# Patient Record
Sex: MALE | Race: WHITE | Employment: OTHER | URBAN - METROPOLITAN AREA
[De-identification: names, ages, dates, MRNs, and addresses within clinical notes are randomized per-mention and may not be internally consistent; named-entity substitution may affect disease eponyms.]

---

## 2017-02-05 ENCOUNTER — HOSPITAL ENCOUNTER (EMERGENCY)
Age: 67
Discharge: HOME | End: 2017-02-05
Attending: EMERGENCY MEDICINE
Payer: COMMERCIAL

## 2017-02-05 ENCOUNTER — APPOINTMENT (OUTPATIENT)
Dept: GENERAL RADIOLOGY | Age: 67
End: 2017-02-05
Payer: COMMERCIAL

## 2017-02-05 VITALS
HEIGHT: 70 IN | RESPIRATION RATE: 16 BRPM | BODY MASS INDEX: 31.5 KG/M2 | SYSTOLIC BLOOD PRESSURE: 131 MMHG | WEIGHT: 220 LBS | OXYGEN SATURATION: 100 % | DIASTOLIC BLOOD PRESSURE: 74 MMHG | TEMPERATURE: 97.7 F | HEART RATE: 72 BPM

## 2017-02-05 DIAGNOSIS — M25.512 ACUTE PAIN OF LEFT SHOULDER: Primary | ICD-10-CM

## 2017-02-05 PROCEDURE — 99283 EMERGENCY DEPT VISIT LOW MDM: CPT

## 2017-02-05 PROCEDURE — 73030 X-RAY EXAM OF SHOULDER: CPT | Performed by: RADIOLOGY

## 2017-02-05 PROCEDURE — 6370000000 HC RX 637 (ALT 250 FOR IP): Performed by: PHYSICIAN ASSISTANT

## 2017-02-05 PROCEDURE — 73030 X-RAY EXAM OF SHOULDER: CPT

## 2017-02-05 RX ORDER — TRAMADOL HYDROCHLORIDE 50 MG/1
50 TABLET ORAL EVERY 6 HOURS PRN
Qty: 20 TABLET | Refills: 0 | Status: SHIPPED | OUTPATIENT
Start: 2017-02-05 | End: 2017-02-15

## 2017-02-05 RX ORDER — TRAMADOL HYDROCHLORIDE 50 MG/1
50 TABLET ORAL ONCE
Status: COMPLETED | OUTPATIENT
Start: 2017-02-05 | End: 2017-02-05

## 2017-02-05 RX ADMIN — TRAMADOL HYDROCHLORIDE 50 MG: 50 TABLET, FILM COATED ORAL at 11:32

## 2017-02-05 ASSESSMENT — PAIN SCALES - GENERAL: PAINLEVEL_OUTOF10: 9

## 2017-11-21 ENCOUNTER — HOSPITAL ENCOUNTER (OUTPATIENT)
Dept: PREADMISSION TESTING | Age: 67
Discharge: HOME | End: 2017-11-21
Payer: COMMERCIAL

## 2017-11-21 VITALS
BODY MASS INDEX: 31.8 KG/M2 | WEIGHT: 222.12 LBS | HEIGHT: 70 IN | SYSTOLIC BLOOD PRESSURE: 111 MMHG | TEMPERATURE: 95.5 F | OXYGEN SATURATION: 97 % | RESPIRATION RATE: 18 BRPM | HEART RATE: 71 BPM | DIASTOLIC BLOOD PRESSURE: 63 MMHG

## 2017-11-21 LAB
EKG ATRIAL RATE: 66 BPM
EKG P AXIS: 7 DEGREES
EKG P-R INTERVAL: 160 MS
EKG Q-T INTERVAL: 432 MS
EKG QRS DURATION: 88 MS
EKG QTC CALCULATION (BAZETT): 452 MS
EKG R AXIS: -5 DEGREES
EKG T AXIS: 39 DEGREES
EKG VENTRICULAR RATE: 66 BPM
GLUCOSE BLD-MCNC: 147 MG/DL (ref 70–99)

## 2017-11-21 PROCEDURE — 93005 ELECTROCARDIOGRAM TRACING: CPT

## 2017-11-21 PROCEDURE — 36415 COLL VENOUS BLD VENIPUNCTURE: CPT

## 2017-11-21 PROCEDURE — 82947 ASSAY GLUCOSE BLOOD QUANT: CPT

## 2017-11-21 RX ORDER — SODIUM CHLORIDE, SODIUM LACTATE, POTASSIUM CHLORIDE, CALCIUM CHLORIDE 600; 310; 30; 20 MG/100ML; MG/100ML; MG/100ML; MG/100ML
1000 INJECTION, SOLUTION INTRAVENOUS CONTINUOUS
Status: CANCELLED | OUTPATIENT
Start: 2017-11-21

## 2017-11-21 NOTE — H&P
History and Physical    Pt Name: Ana Scheuermann, MD  MRN: [id number]  YOB: 1950  Date of evaluation: 11/21/2017  Primary Care Physician: Chantelle Garcia MD  Patient evaluated at the request of  Dr. 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 Past Medical History      has a past medical history of Hyperlipidemia, mixed; Kidney stone; Neuropathy ([address] and Shingles. Past Surgical History   has a past surgical history that includes back surgery (03/1989) and Tonsillectomy (1954). Medications   Scheduled Meds:  Current Outpatient Rx   Medication Sig Dispense Refill    rosuvastatin (CRESTOR) 10 MG tablet TAKE 1 TABLET DAILY 90 tablet 2    pregabalin (LYRICA) 150 MG capsule Take 1 capsule by mouth 2 times daily 180 capsule 1     Continuous Infusions:  PRN Meds:. Allergies  has No Known Allergies. Family History    family history is not on file. No family status information on file. Social History  Social History     Social History    Marital status:      Spouse name: N/A    Number of children: N/A    Years of education: N/A     Occupational History    Not on file. Social History Main Topics    Smoking status: Never Smoker    Smokeless tobacco: Never Used    Alcohol use Yes      Comment: socially    Drug use: No    Sexual activity: Not on file     Other Topics Concern    Not on file     Social History Narrative    No narrative on file        Service:No    Occupation: chiropractor     Hobbies:  Rene     OBJECTIVE:   VITALS:  height is 5' 10\" (1.778 m) and weight is 222 lb 1.9 oz (100.8 kg). His oral temperature is 95.5 °F (35.3 °C). His blood pressure is 111/63 and his pulse is 71.  His

## 2017-11-21 NOTE — ANESTHESIA PRE-OP
Anesthesia Focused Assessment    STOP-BANG Sleep Apnea Questionnaire    Obstructive Sleep Apnea:                                                                 No     Machine used:                                                                                  No            SNORE loudly (heard through closed doors)? No      TIRED, fatigued, sleepy during daytime? No      OBSERVED stopping breathing during sleep? No      High blood PRESSURE being treated? No      BMI over 35? No  AGE over 48? Yes  NECK circumference over 16\"? Yes   GENDER (male)? Yes                High risk 5-8  Intermediate risk 3-4  Low risk 0-2    Total 3  High risk 5-8  Intermediate risk 3-4  Low risk 0-2      Type 1 DM:                                                                                        No    TYPE 2:                                                                                             No    If yes, insulin dependent? No    Coronary Artery Disease :                                                                No        Active smoker                                                                                    no    Drinks Alcohol                                                                                   Yes, occasional    Dentition: Dentures                                                                            No              Partial                                                                                                 No    Any loose or missing teeth                                                                 No    Defib / AICD / Pacemaker:                                                               No    Renal Failure: No    If Yes, On dialysis?       Hx of anesthesia complications with Patient                               No    Hx of anesthesia complications with family No    Medical or cardiac clearance ordered:                                         Yes, medical with heart     Anesthesiologist called:                                                                Yes, Dr. Bon Garg PA-C  Electronically signed 11/21/2017 at 8:29 AM

## 2017-12-07 ENCOUNTER — ANESTHESIA EVENT (OUTPATIENT)
Dept: OPERATING ROOM | Age: 67
End: 2017-12-07
Payer: COMMERCIAL

## 2017-12-07 ENCOUNTER — ANESTHESIA (OUTPATIENT)
Dept: OPERATING ROOM | Age: 67
End: 2017-12-07
Payer: COMMERCIAL

## 2017-12-07 ENCOUNTER — HOSPITAL ENCOUNTER (OUTPATIENT)
Age: 67
Setting detail: OUTPATIENT SURGERY
Discharge: HOME | End: 2017-12-07
Attending: PLASTIC SURGERY | Admitting: PLASTIC SURGERY
Payer: COMMERCIAL

## 2017-12-07 VITALS
HEIGHT: 70 IN | WEIGHT: 220 LBS | RESPIRATION RATE: 16 BRPM | DIASTOLIC BLOOD PRESSURE: 71 MMHG | BODY MASS INDEX: 31.5 KG/M2 | SYSTOLIC BLOOD PRESSURE: 117 MMHG | HEART RATE: 55 BPM | OXYGEN SATURATION: 97 % | TEMPERATURE: 97.5 F

## 2017-12-07 VITALS
DIASTOLIC BLOOD PRESSURE: 53 MMHG | TEMPERATURE: 91.2 F | RESPIRATION RATE: 10 BRPM | SYSTOLIC BLOOD PRESSURE: 85 MMHG | OXYGEN SATURATION: 97 %

## 2017-12-07 PROCEDURE — 3600000004 HC SURGERY LEVEL 4 BASE: Performed by: PLASTIC SURGERY

## 2017-12-07 PROCEDURE — 2580000003 HC RX 258: Performed by: ANESTHESIOLOGY

## 2017-12-07 PROCEDURE — 3700000001 HC ADD 15 MINUTES (ANESTHESIA): Performed by: PLASTIC SURGERY

## 2017-12-07 PROCEDURE — 2500000003 HC RX 250 WO HCPCS: Performed by: SPECIALIST

## 2017-12-07 PROCEDURE — 6360000002 HC RX W HCPCS: Performed by: SPECIALIST

## 2017-12-07 PROCEDURE — 3600000014 HC SURGERY LEVEL 4 ADDTL 15MIN: Performed by: PLASTIC SURGERY

## 2017-12-07 PROCEDURE — 6360000002 HC RX W HCPCS: Performed by: ANESTHESIOLOGY

## 2017-12-07 PROCEDURE — 7100000001 HC PACU RECOVERY - ADDTL 15 MIN: Performed by: PLASTIC SURGERY

## 2017-12-07 PROCEDURE — 7100000010 HC PHASE II RECOVERY - FIRST 15 MIN: Performed by: PLASTIC SURGERY

## 2017-12-07 PROCEDURE — 2500000003 HC RX 250 WO HCPCS: Performed by: PLASTIC SURGERY

## 2017-12-07 PROCEDURE — 3700000000 HC ANESTHESIA ATTENDED CARE: Performed by: PLASTIC SURGERY

## 2017-12-07 PROCEDURE — 7100000011 HC PHASE II RECOVERY - ADDTL 15 MIN: Performed by: PLASTIC SURGERY

## 2017-12-07 PROCEDURE — A6223 GAUZE >16<=48 NO W/SAL W/O B: HCPCS | Performed by: PLASTIC SURGERY

## 2017-12-07 PROCEDURE — 6360000002 HC RX W HCPCS: Performed by: PLASTIC SURGERY

## 2017-12-07 PROCEDURE — 2580000003 HC RX 258: Performed by: PLASTIC SURGERY

## 2017-12-07 PROCEDURE — 7100000000 HC PACU RECOVERY - FIRST 15 MIN: Performed by: PLASTIC SURGERY

## 2017-12-07 PROCEDURE — 6360000002 HC RX W HCPCS: Performed by: STUDENT IN AN ORGANIZED HEALTH CARE EDUCATION/TRAINING PROGRAM

## 2017-12-07 PROCEDURE — 6370000000 HC RX 637 (ALT 250 FOR IP): Performed by: ANESTHESIOLOGY

## 2017-12-07 RX ORDER — ONDANSETRON 2 MG/ML
4 INJECTION INTRAMUSCULAR; INTRAVENOUS
Status: DISCONTINUED | OUTPATIENT
Start: 2017-12-07 | End: 2017-12-07 | Stop reason: HOSPADM

## 2017-12-07 RX ORDER — FENTANYL CITRATE 50 UG/ML
INJECTION, SOLUTION INTRAMUSCULAR; INTRAVENOUS PRN
Status: DISCONTINUED | OUTPATIENT
Start: 2017-12-07 | End: 2017-12-07 | Stop reason: SDUPTHER

## 2017-12-07 RX ORDER — DIPHENHYDRAMINE HYDROCHLORIDE 50 MG/ML
12.5 INJECTION INTRAMUSCULAR; INTRAVENOUS
Status: DISCONTINUED | OUTPATIENT
Start: 2017-12-07 | End: 2017-12-07 | Stop reason: HOSPADM

## 2017-12-07 RX ORDER — SCOLOPAMINE TRANSDERMAL SYSTEM 1 MG/1
1 PATCH, EXTENDED RELEASE TRANSDERMAL ONCE
Status: DISCONTINUED | OUTPATIENT
Start: 2017-12-07 | End: 2017-12-07 | Stop reason: HOSPADM

## 2017-12-07 RX ORDER — LIDOCAINE HYDROCHLORIDE 10 MG/ML
INJECTION, SOLUTION EPIDURAL; INFILTRATION; INTRACAUDAL; PERINEURAL PRN
Status: DISCONTINUED | OUTPATIENT
Start: 2017-12-07 | End: 2017-12-07 | Stop reason: SDUPTHER

## 2017-12-07 RX ORDER — MIDAZOLAM HYDROCHLORIDE 1 MG/ML
2 INJECTION INTRAMUSCULAR; INTRAVENOUS ONCE
Status: COMPLETED | OUTPATIENT
Start: 2017-12-07 | End: 2017-12-07

## 2017-12-07 RX ORDER — MAGNESIUM HYDROXIDE 1200 MG/15ML
LIQUID ORAL CONTINUOUS PRN
Status: DISCONTINUED | OUTPATIENT
Start: 2017-12-07 | End: 2017-12-07 | Stop reason: HOSPADM

## 2017-12-07 RX ORDER — PROMETHAZINE HYDROCHLORIDE 25 MG/ML
6.25 INJECTION, SOLUTION INTRAMUSCULAR; INTRAVENOUS
Status: DISCONTINUED | OUTPATIENT
Start: 2017-12-07 | End: 2017-12-07 | Stop reason: HOSPADM

## 2017-12-07 RX ORDER — FENTANYL CITRATE 50 UG/ML
50 INJECTION, SOLUTION INTRAMUSCULAR; INTRAVENOUS EVERY 5 MIN PRN
Status: DISCONTINUED | OUTPATIENT
Start: 2017-12-07 | End: 2017-12-07 | Stop reason: HOSPADM

## 2017-12-07 RX ORDER — FENTANYL CITRATE 50 UG/ML
25 INJECTION, SOLUTION INTRAMUSCULAR; INTRAVENOUS EVERY 5 MIN PRN
Status: DISCONTINUED | OUTPATIENT
Start: 2017-12-07 | End: 2017-12-07 | Stop reason: HOSPADM

## 2017-12-07 RX ORDER — DEXAMETHASONE SODIUM PHOSPHATE 10 MG/ML
INJECTION INTRAMUSCULAR; INTRAVENOUS PRN
Status: DISCONTINUED | OUTPATIENT
Start: 2017-12-07 | End: 2017-12-07 | Stop reason: SDUPTHER

## 2017-12-07 RX ORDER — PROPOFOL 10 MG/ML
INJECTION, EMULSION INTRAVENOUS PRN
Status: DISCONTINUED | OUTPATIENT
Start: 2017-12-07 | End: 2017-12-07 | Stop reason: SDUPTHER

## 2017-12-07 RX ORDER — BUPIVACAINE HYDROCHLORIDE AND EPINEPHRINE 2.5; 5 MG/ML; UG/ML
INJECTION, SOLUTION EPIDURAL; INFILTRATION; INTRACAUDAL; PERINEURAL PRN
Status: DISCONTINUED | OUTPATIENT
Start: 2017-12-07 | End: 2017-12-07 | Stop reason: HOSPADM

## 2017-12-07 RX ORDER — CEPHALEXIN 500 MG/1
500 CAPSULE ORAL 3 TIMES DAILY
Qty: 21 CAPSULE | Refills: 0 | Status: SHIPPED | OUTPATIENT
Start: 2017-12-07 | End: 2017-12-14

## 2017-12-07 RX ORDER — SODIUM CHLORIDE, SODIUM LACTATE, POTASSIUM CHLORIDE, CALCIUM CHLORIDE 600; 310; 30; 20 MG/100ML; MG/100ML; MG/100ML; MG/100ML
1000 INJECTION, SOLUTION INTRAVENOUS CONTINUOUS
Status: DISCONTINUED | OUTPATIENT
Start: 2017-12-07 | End: 2017-12-07 | Stop reason: HOSPADM

## 2017-12-07 RX ORDER — TRIAMCINOLONE ACETONIDE 40 MG/ML
INJECTION, SUSPENSION INTRA-ARTICULAR; INTRAMUSCULAR PRN
Status: DISCONTINUED | OUTPATIENT
Start: 2017-12-07 | End: 2017-12-07 | Stop reason: HOSPADM

## 2017-12-07 RX ORDER — OXYCODONE HYDROCHLORIDE AND ACETAMINOPHEN 5; 325 MG/1; MG/1
1 TABLET ORAL EVERY 4 HOURS PRN
Qty: 30 TABLET | Refills: 0 | Status: SHIPPED | OUTPATIENT
Start: 2017-12-07 | End: 2017-12-14

## 2017-12-07 RX ORDER — ONDANSETRON 2 MG/ML
INJECTION INTRAMUSCULAR; INTRAVENOUS PRN
Status: DISCONTINUED | OUTPATIENT
Start: 2017-12-07 | End: 2017-12-07 | Stop reason: SDUPTHER

## 2017-12-07 RX ADMIN — SODIUM CHLORIDE, POTASSIUM CHLORIDE, SODIUM LACTATE AND CALCIUM CHLORIDE 1000 ML: 600; 310; 30; 20 INJECTION, SOLUTION INTRAVENOUS at 08:23

## 2017-12-07 RX ADMIN — MIDAZOLAM HYDROCHLORIDE 2 MG: 1 INJECTION, SOLUTION INTRAMUSCULAR; INTRAVENOUS at 08:39

## 2017-12-07 RX ADMIN — FENTANYL CITRATE 50 MCG: 50 INJECTION INTRAMUSCULAR; INTRAVENOUS at 08:48

## 2017-12-07 RX ADMIN — ONDANSETRON 4 MG: 2 INJECTION INTRAMUSCULAR; INTRAVENOUS at 09:32

## 2017-12-07 RX ADMIN — LIDOCAINE HYDROCHLORIDE 50 MG: 10 INJECTION, SOLUTION EPIDURAL; INFILTRATION; INTRACAUDAL; PERINEURAL at 08:48

## 2017-12-07 RX ADMIN — Medication 2 G: at 08:55

## 2017-12-07 RX ADMIN — FENTANYL CITRATE 25 MCG: 50 INJECTION INTRAMUSCULAR; INTRAVENOUS at 09:23

## 2017-12-07 RX ADMIN — PROPOFOL 200 MG: 10 INJECTION, EMULSION INTRAVENOUS at 08:48

## 2017-12-07 RX ADMIN — FENTANYL CITRATE 25 MCG: 50 INJECTION INTRAMUSCULAR; INTRAVENOUS at 09:21

## 2017-12-07 RX ADMIN — DEXAMETHASONE SODIUM PHOSPHATE 10 MG: 10 INJECTION INTRAMUSCULAR; INTRAVENOUS at 08:54

## 2017-12-07 ASSESSMENT — PULMONARY FUNCTION TESTS
PIF_VALUE: 1
PIF_VALUE: 3
PIF_VALUE: 20
PIF_VALUE: 8
PIF_VALUE: 8
PIF_VALUE: 1
PIF_VALUE: 6
PIF_VALUE: 8
PIF_VALUE: 5
PIF_VALUE: 6
PIF_VALUE: 9
PIF_VALUE: 12
PIF_VALUE: 14
PIF_VALUE: 2
PIF_VALUE: 8
PIF_VALUE: 2
PIF_VALUE: 8
PIF_VALUE: 6
PIF_VALUE: 6
PIF_VALUE: 4
PIF_VALUE: 11
PIF_VALUE: 15
PIF_VALUE: 16
PIF_VALUE: 3
PIF_VALUE: 5
PIF_VALUE: 4
PIF_VALUE: 8
PIF_VALUE: 3
PIF_VALUE: 5
PIF_VALUE: 6
PIF_VALUE: 1
PIF_VALUE: 6
PIF_VALUE: 2
PIF_VALUE: 6
PIF_VALUE: 5
PIF_VALUE: 1
PIF_VALUE: 3
PIF_VALUE: 6
PIF_VALUE: 8
PIF_VALUE: 6
PIF_VALUE: 8
PIF_VALUE: 8
PIF_VALUE: 5
PIF_VALUE: 8
PIF_VALUE: 5
PIF_VALUE: 3
PIF_VALUE: 8
PIF_VALUE: 5
PIF_VALUE: 6
PIF_VALUE: 6
PIF_VALUE: 8
PIF_VALUE: 8
PIF_VALUE: 4
PIF_VALUE: 8
PIF_VALUE: 5
PIF_VALUE: 8
PIF_VALUE: 4
PIF_VALUE: 3
PIF_VALUE: 3
PIF_VALUE: 8
PIF_VALUE: 8

## 2017-12-07 ASSESSMENT — PAIN SCALES - WONG BAKER
WONGBAKER_NUMERICALRESPONSE: 0

## 2017-12-07 ASSESSMENT — PAIN SCALES - GENERAL
PAINLEVEL_OUTOF10: 0

## 2017-12-07 ASSESSMENT — PAIN - FUNCTIONAL ASSESSMENT: PAIN_FUNCTIONAL_ASSESSMENT: 0-10

## 2017-12-07 NOTE — H&P (VIEW-ONLY)
History and Physical    Pt Name: Essie Correa MD  MRN: [id number]  YOB: 1950  Date of evaluation: 11/21/2017  Primary Care Physician: Vik Morris MD  Patient evaluated at the request of  Dr. 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 Past Medical History      has a past medical history of Hyperlipidemia, mixed; Kidney stone; Neuropathy ([address] and Shingles. Past Surgical History   has a past surgical history that includes back surgery (03/1989) and Tonsillectomy (1954). Medications   Scheduled Meds:  Current Outpatient Rx   Medication Sig Dispense Refill    rosuvastatin (CRESTOR) 10 MG tablet TAKE 1 TABLET DAILY 90 tablet 2    pregabalin (LYRICA) 150 MG capsule Take 1 capsule by mouth 2 times daily 180 capsule 1     Continuous Infusions:  PRN Meds:. Allergies  has No Known Allergies. Family History    family history is not on file. No family status information on file. Social History  Social History     Social History    Marital status:      Spouse name: N/A    Number of children: N/A    Years of education: N/A     Occupational History    Not on file. Social History Main Topics    Smoking status: Never Smoker    Smokeless tobacco: Never Used    Alcohol use Yes      Comment: socially    Drug use: No    Sexual activity: Not on file     Other Topics Concern    Not on file     Social History Narrative    No narrative on file        Service:No    Occupation: chiropractor     Hobbies:  [facility]     OBJECTIVE:   VITALS:  height is 5' 10\" (1.778 m) and weight is 222 lb 1.9 oz (100.8 kg). His oral temperature is 95.5 °F (35.3 °C). His blood pressure is 111/63 and his pulse is 71.  His respiration is 18 and oxygen saturation is 97%. CONSTITUTIONAL: Alert and oriented times 3, no acute distress and cooperative to examination. friendly and pleasant     SKIN: rash No    HEENT: Head is normocephalic, atraumatic. EOMI, PERRLA    Oral air way :slightly narrow Yes    NECK: neck supple, no lymphadenopathy noted, trachea midline and straight       2+ carotid, no bruit    LUNGS: Chest expands equally bilaterally upon respiration, no accessory muscle used. Ausculation reveals no adventitious breath sounds. CARDIOVASCULAR: \"Heart sounds are normal.  Regular rate and rhythm without murmur,    ABDOMEN: Bowel sounds are present in all four quadrants      GENATALIA:Deferred. NEUROLOGIC: \"CN II-XII are grossly intact. EXTREMITIES: Pitting edema:  No,  Varicose veins: No     Dorsal pedal/posterior tibial pulses palpable: Yes         Strength:  Normal  inj left       Patient Active Problem List   Diagnosis    Peripheral neuropathic pain    Hyperlipidemia, mixed    Hyperglycemia               IMPRESSIONS:   1. Trigger finger right hand 2nd and 3rd digit   2.  does not have any pertinent problems on file.     Eileen [facility]  Electronically signed 11/21/2017 at 8:29 AM       Scheduled for:Trigger finger right hand 2nd and 3rd digit  Release A-one pulley

## 2017-12-07 NOTE — PROGRESS NOTES
Dc instructions reviewed with pt's wife. Copies provided with scripts.  All questions presented answered

## 2017-12-07 NOTE — ANESTHESIA POSTPROCEDURE EVALUATION
Department of Anesthesiology  Postprocedure Note    Patient: Jacques Meigs, MD  MRN: [id number]  YOB: 1950  Date of evaluation: 12/7/2017  Time:  1:10 PM     Procedure Summary     Date:  12/07/17 Room / Location:  [place] [address] / [facility] OR    Anesthesia Start:  0845 Anesthesia Stop:  1001    Procedure:  RELEASE A-ONE PULLEY INDEX, MIDDLE FINGERS (Right ) Diagnosis:  (TRIGGER FINGERS RIGHT INDEX, MIDDLE )    Surgeon:  Hamilton Chase MD Responsible Provider:  Janelle Urena MD    Anesthesia Type:  general ASA Status:  2          Anesthesia Type: general    Jackie Phase I: Jackie Score: 10    Jackie Phase II: Jackie Score: 10    Last vitals: Reviewed and per EMR flowsheets.        Anesthesia Post Evaluation    Patient location during evaluation: PACU  Patient participation: complete - patient participated  Level of consciousness: awake and alert  Pain score: 0  Airway patency: patent  Nausea & Vomiting: no nausea and no vomiting  Complications: no  Cardiovascular status: blood pressure returned to baseline and hemodynamically stable  Respiratory status: acceptable and nonlabored ventilation  Hydration status: euvolemic

## 2017-12-07 NOTE — ANESTHESIA PRE PROCEDURE
Department of Anesthesiology  Preprocedure Note       Name:  Essie Correa MD   Age:  78 y.o.  :  1950                                          MRN:  [id number]         Date:  2017      Surgeon: Kadi Parra): Eliseo Jara MD    Procedure: Procedure(s):  RELEASE A-ONE PULLEY INDEX, MIDDLE FINGERS    Medications prior to admission:   Prior to Admission medications    Medication Sig Start Date End Date Taking? Authorizing Provider   rosuvastatin (CRESTOR) 10 MG tablet TAKE 1 TABLET DAILY 17  Yes Vik Morris MD   pregabalin (LYRICA) 150 MG capsule Take 1 capsule by mouth 2 times daily 17  Yes Vik Morris MD       Current medications:    Current Facility-Administered Medications   Medication Dose Route Frequency Provider Last Rate Last Dose    lactated ringers infusion 1,000 mL  1,000 mL Intravenous Continuous Abner Eastman MD 50 mL/hr at 17 0823 1,000 mL at 17 2225    ceFAZolin (ANCEF) 2 g in sterile water 20 mL IV syringe  2 g Intravenous On Call to [address], DO           Allergies:     Allergies   Allergen Reactions    Seasonal      Sneezing cough       Problem List:    Patient Active Problem List   Diagnosis Code    Peripheral neuropathic pain M79.2    Hyperlipidemia, mixed E78.2    Hyperglycemia R73.9       Past Medical History:        Diagnosis Date    Fall 2013    FRACTURE HUMERAL HEAD    History of shoulder surgery 2013    MANIPULATION UNDER ANESTHESIA    Hyperlipidemia, mixed 2016    ON RX    Kidney stone 2015    PASSED ON OWN    Neuropathy ([address]     BILAT FEET    Shingles 2007       Past Surgical History:        Procedure Laterality Date    BACK SURGERY  1989    LAMINOTOMY L4-5    HAND SURGERY Right 2006    RING TRIGGER FINGER    TONSILLECTOMY         Social History:    Social History   Substance Use Topics    Smoking status: Never Smoker    Smokeless tobacco: Never Used    Alcohol use Yes Comment: BEER WINE OR MIXED DRINKS 6 BEERS A MONTH WINE OR MIXED DRIINKS  SELDOM                                Counseling given: Not Answered      Vital Signs (Current):   Vitals:    12/07/17 0802   BP: 107/68   Pulse: 60   Resp: 20   Temp: 95.9 °F (35.5 °C)   TempSrc: Temporal   SpO2: 100%   Weight: 220 lb (99.8 kg)   Height: 5' 10\" (1.778 m)                                              BP Readings from Last 3 Encounters:   12/07/17 107/68   11/21/17 111/63   11/06/17 117/75       NPO Status: Time of last liquid consumption: 2100                        Time of last solid consumption: 2100                        Date of last liquid consumption: 12/06/17                        Date of last solid food consumption: 12/06/17    BMI:   Wt Readings from Last 3 Encounters:   12/07/17 220 lb (99.8 kg)   11/21/17 222 lb 1.9 oz (100.8 kg)   11/06/17 220 lb (99.8 kg)     Body mass index is 31.57 kg/m². CBC:   Lab Results   Component Value Date    WBC 8.6 10/11/2014    RBC 5.33 10/11/2014    RBC 5.26 03/22/2012    HGB 16.6 10/11/2014    HCT 48.3 10/11/2014    MCV 90.7 10/11/2014    RDW 13.0 10/11/2014     10/11/2014     03/22/2012       CMP:   Lab Results   Component Value Date     10/14/2014    K 3.9 10/14/2014    CL 99 10/14/2014    CO2 26 10/14/2014    BUN 16 10/14/2014    CREATININE 1.08 10/14/2014    GFRAA >60 10/14/2014    LABGLOM >60 10/14/2014    GLUCOSE 147 11/21/2017    GLUCOSE 102 03/22/2012    PROT 6.5 10/14/2014    CALCIUM 9.0 10/14/2014    BILITOT 0.90 10/14/2014    ALKPHOS 59 10/14/2014    AST 22 10/14/2014    ALT 20 10/14/2014       POC Tests: No results for input(s): POCGLU, POCNA, POCK, POCCL, POCBUN, POCHEMO, POCHCT in the last 72 hours.     Coags: No results found for: PROTIME, INR, APTT    HCG (If Applicable): No results found for: PREGTESTUR, PREGSERUM, HCG, HCGQUANT     ABGs: No results found for: PHART, PO2ART, NZV8XOK, PWL6RPH, BEART, A9BSVFEE     Type & Screen (If Applicable):  No results found for: LABABO, [address]    Anesthesia Evaluation  Patient summary reviewed   history of anesthetic complications: PONV. Airway: Mallampati: III  TM distance: <3 FB   Neck ROM: full  Mouth opening: > = 3 FB Dental:          Pulmonary:Negative Pulmonary ROS breath sounds clear to auscultation                             Cardiovascular:  Exercise tolerance: good (>4 METS),   (+) CAD:, hyperlipidemia    (-) pacemaker, hypertension, valvular problems/murmurs, past MI, CABG/stent, dysrhythmias,  angina and  CHF    ECG reviewed  Rhythm: regular  Rate: normal           Beta Blocker:  Not on Beta Blocker         Neuro/Psych:   (+) neuromuscular disease (neuropathic pain):,    (-) seizures, TIA, CVA, headaches, psychiatric history and depression/anxiety            GI/Hepatic/Renal:   (+) renal disease: kidney stones,      (-) hiatal hernia, GERD, PUD, hepatitis, liver disease, bowel prep and no morbid obesity       Endo/Other: Negative Endo/Other ROS                    Abdominal:           Vascular: negative vascular ROS. Anesthesia Plan      general     ASA 2       Induction: intravenous. MIPS: Postoperative opioids intended and Prophylactic antiemetics administered. Anesthetic plan and risks discussed with patient. Plan discussed with CRNA.                   Lois Rojas MD   12/7/2017

## 2017-12-11 PROBLEM — M65.331 TRIGGER MIDDLE FINGER OF RIGHT HAND: Status: ACTIVE | Noted: 2017-12-11

## 2017-12-11 PROBLEM — M65.321 TRIGGER INDEX FINGER OF RIGHT HAND: Status: ACTIVE | Noted: 2017-12-11

## 2017-12-28 ENCOUNTER — HOSPITAL ENCOUNTER (OUTPATIENT)
Age: 67
Discharge: HOME | End: 2017-12-28
Payer: COMMERCIAL

## 2017-12-28 ENCOUNTER — HOSPITAL ENCOUNTER (INPATIENT)
Age: 67
LOS: 2 days | Discharge: HOME | DRG: 247 | End: 2017-12-30
Attending: EMERGENCY MEDICINE | Admitting: INTERNAL MEDICINE
Payer: COMMERCIAL

## 2017-12-28 ENCOUNTER — TELEPHONE (OUTPATIENT)
Dept: FAMILY MEDICINE CLINIC | Age: 67
End: 2017-12-28

## 2017-12-28 ENCOUNTER — APPOINTMENT (OUTPATIENT)
Dept: GENERAL RADIOLOGY | Age: 67
DRG: 247 | End: 2017-12-28
Payer: COMMERCIAL

## 2017-12-28 ENCOUNTER — OFFICE VISIT (OUTPATIENT)
Dept: FAMILY MEDICINE CLINIC | Age: 67
End: 2017-12-28
Payer: COMMERCIAL

## 2017-12-28 VITALS
BODY MASS INDEX: 31.22 KG/M2 | OXYGEN SATURATION: 97 % | HEIGHT: 71 IN | TEMPERATURE: 97.3 F | SYSTOLIC BLOOD PRESSURE: 103 MMHG | DIASTOLIC BLOOD PRESSURE: 63 MMHG | WEIGHT: 223 LBS | HEART RATE: 78 BPM

## 2017-12-28 DIAGNOSIS — I21.4 NSTEMI (NON-ST ELEVATED MYOCARDIAL INFARCTION) (HCC): Primary | ICD-10-CM

## 2017-12-28 DIAGNOSIS — M79.642 LEFT HAND PAIN: Primary | ICD-10-CM

## 2017-12-28 DIAGNOSIS — R07.89 CHEST WALL PAIN: ICD-10-CM

## 2017-12-28 PROBLEM — R77.8 ELEVATED TROPONIN: Status: ACTIVE | Noted: 2017-12-28

## 2017-12-28 PROBLEM — R73.03 PREDIABETES: Status: ACTIVE | Noted: 2017-12-28

## 2017-12-28 PROBLEM — R79.89 ELEVATED TROPONIN: Status: ACTIVE | Noted: 2017-12-28

## 2017-12-28 PROBLEM — R07.9 CHEST PAIN ON EXERTION: Status: ACTIVE | Noted: 2017-12-28

## 2017-12-28 LAB
ABSOLUTE EOS #: 0.2 K/UL (ref 0–0.44)
ABSOLUTE EOS #: 0.3 K/UL (ref 0–0.4)
ABSOLUTE IMMATURE GRANULOCYTE: <0.03 K/UL (ref 0–0.3)
ABSOLUTE IMMATURE GRANULOCYTE: ABNORMAL K/UL (ref 0–0.3)
ABSOLUTE LYMPH #: 1.8 K/UL (ref 1–4.8)
ABSOLUTE LYMPH #: 2.04 K/UL (ref 1.1–3.7)
ABSOLUTE MONO #: 0.6 K/UL (ref 0.1–1.3)
ABSOLUTE MONO #: 0.66 K/UL (ref 0.1–1.2)
ANION GAP SERPL CALCULATED.3IONS-SCNC: 10 MMOL/L (ref 9–17)
ANION GAP SERPL CALCULATED.3IONS-SCNC: 13 MMOL/L (ref 9–17)
BASOPHILS # BLD: 1 % (ref 0–2)
BASOPHILS # BLD: 1 % (ref 0–2)
BASOPHILS ABSOLUTE: 0.06 K/UL (ref 0–0.2)
BASOPHILS ABSOLUTE: 0.1 K/UL (ref 0–0.2)
BUN BLDV-MCNC: 17 MG/DL (ref 8–23)
BUN BLDV-MCNC: 17 MG/DL (ref 8–23)
BUN/CREAT BLD: ABNORMAL (ref 9–20)
BUN/CREAT BLD: ABNORMAL (ref 9–20)
CALCIUM SERPL-MCNC: 9 MG/DL (ref 8.6–10.4)
CALCIUM SERPL-MCNC: 9.1 MG/DL (ref 8.6–10.4)
CHLORIDE BLD-SCNC: 100 MMOL/L (ref 98–107)
CHLORIDE BLD-SCNC: 103 MMOL/L (ref 98–107)
CHOLESTEROL/HDL RATIO: 3.1
CHOLESTEROL: 135 MG/DL
CO2: 21 MMOL/L (ref 20–31)
CO2: 28 MMOL/L (ref 20–31)
CREAT SERPL-MCNC: 0.68 MG/DL (ref 0.7–1.2)
CREAT SERPL-MCNC: 0.77 MG/DL (ref 0.7–1.2)
DIFFERENTIAL TYPE: ABNORMAL
DIFFERENTIAL TYPE: NORMAL
EKG ATRIAL RATE: 65 BPM
EKG ATRIAL RATE: 76 BPM
EKG P AXIS: 10 DEGREES
EKG P AXIS: 3 DEGREES
EKG P-R INTERVAL: 168 MS
EKG P-R INTERVAL: 170 MS
EKG Q-T INTERVAL: 368 MS
EKG Q-T INTERVAL: 436 MS
EKG QRS DURATION: 64 MS
EKG QRS DURATION: 78 MS
EKG QTC CALCULATION (BAZETT): 414 MS
EKG QTC CALCULATION (BAZETT): 453 MS
EKG R AXIS: -30 DEGREES
EKG R AXIS: -36 DEGREES
EKG T AXIS: -3 DEGREES
EKG T AXIS: 4 DEGREES
EKG VENTRICULAR RATE: 65 BPM
EKG VENTRICULAR RATE: 76 BPM
EOSINOPHILS RELATIVE PERCENT: 3 % (ref 1–4)
EOSINOPHILS RELATIVE PERCENT: 4 % (ref 0–4)
ESTIMATED AVERAGE GLUCOSE: 134 MG/DL
GFR AFRICAN AMERICAN: >60 ML/MIN
GFR AFRICAN AMERICAN: >60 ML/MIN
GFR NON-AFRICAN AMERICAN: >60 ML/MIN
GFR NON-AFRICAN AMERICAN: >60 ML/MIN
GFR SERPL CREATININE-BSD FRML MDRD: ABNORMAL ML/MIN/{1.73_M2}
GLUCOSE BLD-MCNC: 110 MG/DL (ref 70–99)
GLUCOSE BLD-MCNC: 139 MG/DL (ref 70–99)
HBA1C MFR BLD: 6.3 % (ref 4–6)
HCT VFR BLD CALC: 48.5 % (ref 41–53)
HCT VFR BLD CALC: 49.9 % (ref 40.7–50.3)
HDLC SERPL-MCNC: 44 MG/DL
HEMOGLOBIN: 16.9 G/DL (ref 13.5–17.5)
HEMOGLOBIN: 16.9 G/DL (ref 13–17)
IMMATURE GRANULOCYTES: 0 %
IMMATURE GRANULOCYTES: ABNORMAL %
LDL CHOLESTEROL: 62 MG/DL (ref 0–130)
LYMPHOCYTES # BLD: 26 % (ref 24–43)
LYMPHOCYTES # BLD: 27 % (ref 24–44)
MCH RBC QN AUTO: 31.2 PG (ref 25.2–33.5)
MCH RBC QN AUTO: 32.1 PG (ref 26–34)
MCHC RBC AUTO-ENTMCNC: 33.9 G/DL (ref 28.4–34.8)
MCHC RBC AUTO-ENTMCNC: 34.8 G/DL (ref 31–37)
MCV RBC AUTO: 92.1 FL (ref 80–100)
MCV RBC AUTO: 92.2 FL (ref 82.6–102.9)
MONOCYTES # BLD: 9 % (ref 1–7)
MONOCYTES # BLD: 9 % (ref 3–12)
PARTIAL THROMBOPLASTIN TIME: 25.3 SEC (ref 21.3–31.3)
PARTIAL THROMBOPLASTIN TIME: 48.5 SEC (ref 21.3–31.3)
PDW BLD-RTO: 12.3 % (ref 11.8–14.4)
PDW BLD-RTO: 13 % (ref 11.5–14.9)
PLATELET # BLD: 161 K/UL (ref 150–450)
PLATELET # BLD: 179 K/UL (ref 138–453)
PLATELET ESTIMATE: ABNORMAL
PLATELET ESTIMATE: NORMAL
PMV BLD AUTO: 10.9 FL (ref 8.1–13.5)
PMV BLD AUTO: 9.3 FL (ref 6–12)
POTASSIUM SERPL-SCNC: 4.2 MMOL/L (ref 3.7–5.3)
POTASSIUM SERPL-SCNC: 4.6 MMOL/L (ref 3.7–5.3)
RBC # BLD: 5.26 M/UL (ref 4.5–5.9)
RBC # BLD: 5.41 M/UL (ref 4.21–5.77)
RBC # BLD: ABNORMAL 10*6/UL
RBC # BLD: NORMAL 10*6/UL
SEG NEUTROPHILS: 59 % (ref 36–66)
SEG NEUTROPHILS: 61 % (ref 36–65)
SEGMENTED NEUTROPHILS ABSOLUTE COUNT: 4 K/UL (ref 1.3–9.1)
SEGMENTED NEUTROPHILS ABSOLUTE COUNT: 4.83 K/UL (ref 1.5–8.1)
SODIUM BLD-SCNC: 134 MMOL/L (ref 135–144)
SODIUM BLD-SCNC: 141 MMOL/L (ref 135–144)
TRIGL SERPL-MCNC: 144 MG/DL
TROPONIN INTERP: ABNORMAL
TROPONIN T: 0.13 NG/ML
TROPONIN T: 0.15 NG/ML
TROPONIN T: 0.15 NG/ML
TROPONIN T: 0.18 NG/ML
VLDLC SERPL CALC-MCNC: NORMAL MG/DL (ref 1–30)
WBC # BLD: 6.8 K/UL (ref 3.5–11)
WBC # BLD: 7.8 K/UL (ref 3.5–11.3)
WBC # BLD: ABNORMAL 10*3/UL
WBC # BLD: NORMAL 10*3/UL

## 2017-12-28 PROCEDURE — 2580000003 HC RX 258: Performed by: INTERNAL MEDICINE

## 2017-12-28 PROCEDURE — 36415 COLL VENOUS BLD VENIPUNCTURE: CPT

## 2017-12-28 PROCEDURE — 84484 ASSAY OF TROPONIN QUANT: CPT

## 2017-12-28 PROCEDURE — 85730 THROMBOPLASTIN TIME PARTIAL: CPT

## 2017-12-28 PROCEDURE — 99284 EMERGENCY DEPT VISIT MOD MDM: CPT

## 2017-12-28 PROCEDURE — 83036 HEMOGLOBIN GLYCOSYLATED A1C: CPT

## 2017-12-28 PROCEDURE — 96365 THER/PROPH/DIAG IV INF INIT: CPT

## 2017-12-28 PROCEDURE — 1123F ACP DISCUSS/DSCN MKR DOCD: CPT | Performed by: FAMILY MEDICINE

## 2017-12-28 PROCEDURE — G8484 FLU IMMUNIZE NO ADMIN: HCPCS | Performed by: FAMILY MEDICINE

## 2017-12-28 PROCEDURE — 99213 OFFICE O/P EST LOW 20 MIN: CPT | Performed by: FAMILY MEDICINE

## 2017-12-28 PROCEDURE — 2060000000 HC ICU INTERMEDIATE R&B

## 2017-12-28 PROCEDURE — 6370000000 HC RX 637 (ALT 250 FOR IP): Performed by: STUDENT IN AN ORGANIZED HEALTH CARE EDUCATION/TRAINING PROGRAM

## 2017-12-28 PROCEDURE — 4040F PNEUMOC VAC/ADMIN/RCVD: CPT | Performed by: FAMILY MEDICINE

## 2017-12-28 PROCEDURE — 3017F COLORECTAL CA SCREEN DOC REV: CPT | Performed by: FAMILY MEDICINE

## 2017-12-28 PROCEDURE — 80048 BASIC METABOLIC PNL TOTAL CA: CPT

## 2017-12-28 PROCEDURE — 80061 LIPID PANEL: CPT

## 2017-12-28 PROCEDURE — 6360000002 HC RX W HCPCS: Performed by: STUDENT IN AN ORGANIZED HEALTH CARE EDUCATION/TRAINING PROGRAM

## 2017-12-28 PROCEDURE — 85025 COMPLETE CBC W/AUTO DIFF WBC: CPT

## 2017-12-28 PROCEDURE — 1036F TOBACCO NON-USER: CPT | Performed by: FAMILY MEDICINE

## 2017-12-28 PROCEDURE — 93005 ELECTROCARDIOGRAM TRACING: CPT

## 2017-12-28 PROCEDURE — G8427 DOCREV CUR MEDS BY ELIG CLIN: HCPCS | Performed by: FAMILY MEDICINE

## 2017-12-28 PROCEDURE — G8598 ASA/ANTIPLAT THER USED: HCPCS | Performed by: FAMILY MEDICINE

## 2017-12-28 PROCEDURE — G8417 CALC BMI ABV UP PARAM F/U: HCPCS | Performed by: FAMILY MEDICINE

## 2017-12-28 RX ORDER — HEPARIN SODIUM 10000 [USP'U]/100ML
1000 INJECTION, SOLUTION INTRAVENOUS CONTINUOUS
Status: DISCONTINUED | OUTPATIENT
Start: 2017-12-28 | End: 2017-12-29

## 2017-12-28 RX ORDER — HEPARIN SODIUM 1000 [USP'U]/ML
2000 INJECTION, SOLUTION INTRAVENOUS; SUBCUTANEOUS PRN
Status: DISCONTINUED | OUTPATIENT
Start: 2017-12-28 | End: 2017-12-29

## 2017-12-28 RX ORDER — ACETAMINOPHEN 325 MG/1
650 TABLET ORAL EVERY 4 HOURS PRN
Status: DISCONTINUED | OUTPATIENT
Start: 2017-12-28 | End: 2017-12-30 | Stop reason: HOSPADM

## 2017-12-28 RX ORDER — SODIUM CHLORIDE 0.9 % (FLUSH) 0.9 %
10 SYRINGE (ML) INJECTION EVERY 12 HOURS SCHEDULED
Status: DISCONTINUED | OUTPATIENT
Start: 2017-12-28 | End: 2017-12-30 | Stop reason: HOSPADM

## 2017-12-28 RX ORDER — SODIUM CHLORIDE 0.9 % (FLUSH) 0.9 %
10 SYRINGE (ML) INJECTION PRN
Status: DISCONTINUED | OUTPATIENT
Start: 2017-12-28 | End: 2017-12-30 | Stop reason: HOSPADM

## 2017-12-28 RX ORDER — ASPIRIN 81 MG/1
324 TABLET, CHEWABLE ORAL ONCE
Status: COMPLETED | OUTPATIENT
Start: 2017-12-28 | End: 2017-12-28

## 2017-12-28 RX ORDER — HEPARIN SODIUM 1000 [USP'U]/ML
4000 INJECTION, SOLUTION INTRAVENOUS; SUBCUTANEOUS PRN
Status: DISCONTINUED | OUTPATIENT
Start: 2017-12-28 | End: 2017-12-29

## 2017-12-28 RX ORDER — OXYCODONE HYDROCHLORIDE AND ACETAMINOPHEN 5; 325 MG/1; MG/1
1 TABLET ORAL EVERY 4 HOURS PRN
Status: DISCONTINUED | OUTPATIENT
Start: 2017-12-28 | End: 2017-12-30 | Stop reason: HOSPADM

## 2017-12-28 RX ORDER — ROSUVASTATIN CALCIUM 10 MG/1
10 TABLET, COATED ORAL DAILY
Status: DISCONTINUED | OUTPATIENT
Start: 2017-12-28 | End: 2017-12-30

## 2017-12-28 RX ORDER — PREGABALIN 75 MG/1
150 CAPSULE ORAL 2 TIMES DAILY
Status: DISCONTINUED | OUTPATIENT
Start: 2017-12-28 | End: 2017-12-30 | Stop reason: HOSPADM

## 2017-12-28 RX ORDER — SODIUM CHLORIDE 9 MG/ML
INJECTION, SOLUTION INTRAVENOUS CONTINUOUS
Status: DISCONTINUED | OUTPATIENT
Start: 2017-12-28 | End: 2017-12-30

## 2017-12-28 RX ORDER — ONDANSETRON 2 MG/ML
4 INJECTION INTRAMUSCULAR; INTRAVENOUS EVERY 6 HOURS PRN
Status: DISCONTINUED | OUTPATIENT
Start: 2017-12-28 | End: 2017-12-30 | Stop reason: HOSPADM

## 2017-12-28 RX ORDER — HEPARIN SODIUM 1000 [USP'U]/ML
4000 INJECTION, SOLUTION INTRAVENOUS; SUBCUTANEOUS ONCE
Status: COMPLETED | OUTPATIENT
Start: 2017-12-28 | End: 2017-12-28

## 2017-12-28 RX ORDER — OXYCODONE HYDROCHLORIDE AND ACETAMINOPHEN 5; 325 MG/1; MG/1
1 TABLET ORAL EVERY 4 HOURS PRN
COMMUNITY
End: 2019-08-27

## 2017-12-28 RX ADMIN — HEPARIN SODIUM AND DEXTROSE 1000 UNITS/HR: 10000; 5 INJECTION INTRAVENOUS at 15:48

## 2017-12-28 RX ADMIN — PREGABALIN 150 MG: 75 CAPSULE ORAL at 21:06

## 2017-12-28 RX ADMIN — ASPIRIN 81 MG 324 MG: 81 TABLET ORAL at 14:58

## 2017-12-28 RX ADMIN — SODIUM CHLORIDE: 9 INJECTION, SOLUTION INTRAVENOUS at 21:07

## 2017-12-28 RX ADMIN — HEPARIN SODIUM 4000 UNITS: 1000 INJECTION, SOLUTION INTRAVENOUS; SUBCUTANEOUS at 15:47

## 2017-12-28 ASSESSMENT — ENCOUNTER SYMPTOMS
RHINORRHEA: 0
EYE REDNESS: 0
COUGH: 0
SHORTNESS OF BREATH: 0
PHOTOPHOBIA: 0
NAUSEA: 0
ABDOMINAL PAIN: 0
CONSTIPATION: 0
ABDOMINAL PAIN: 0
NAUSEA: 0
SINUS PRESSURE: 0
TROUBLE SWALLOWING: 0
VOMITING: 0
CHEST TIGHTNESS: 0
COUGH: 0
SHORTNESS OF BREATH: 0
BACK PAIN: 0
WHEEZING: 0
SORE THROAT: 0
ABDOMINAL DISTENTION: 0
VOMITING: 0
VOICE CHANGE: 0
DIARRHEA: 0
WHEEZING: 0
BLOOD IN STOOL: 0
EYE ITCHING: 0
SORE THROAT: 0
EYE DISCHARGE: 0
RHINORRHEA: 0
BACK PAIN: 0

## 2017-12-28 ASSESSMENT — PAIN SCALES - GENERAL
PAINLEVEL_OUTOF10: 0
PAINLEVEL_OUTOF10: 1
PAINLEVEL_OUTOF10: 0

## 2017-12-28 ASSESSMENT — PAIN DESCRIPTION - LOCATION: LOCATION: HAND

## 2017-12-28 ASSESSMENT — PAIN DESCRIPTION - DESCRIPTORS: DESCRIPTORS: SORE

## 2017-12-28 ASSESSMENT — PAIN DESCRIPTION - FREQUENCY: FREQUENCY: CONTINUOUS

## 2017-12-28 ASSESSMENT — PAIN DESCRIPTION - ORIENTATION: ORIENTATION: LEFT

## 2017-12-28 ASSESSMENT — PAIN DESCRIPTION - PAIN TYPE: TYPE: ACUTE PAIN

## 2017-12-28 NOTE — PATIENT INSTRUCTIONS
Patient Education        Chest Pain: Care Instructions  Your Care Instructions    There are many things that can cause chest pain. Some are not serious and will get better on their own in a few days. But some kinds of chest pain need more testing and treatment. Your doctor may have recommended a follow-up visit in the next 8 to 12 hours. If you are not getting better, you may need more tests or treatment. Even though your doctor has released you, you still need to watch for any problems. The doctor carefully checked you, but sometimes problems can develop later. If you have new symptoms or if your symptoms do not get better, get medical care right away. If you have worse or different chest pain or pressure that lasts more than 5 minutes or you passed out (lost consciousness), call 911 or seek other emergency help right away. A medical visit is only one step in your treatment. Even if you feel better, you still need to do what your doctor recommends, such as going to all suggested follow-up appointments and taking medicines exactly as directed. This will help you recover and help prevent future problems. How can you care for yourself at home? · Rest until you feel better. · Take your medicine exactly as prescribed. Call your doctor if you think you are having a problem with your medicine. · Do not drive after taking a prescription pain medicine. When should you call for help? Call 911 if:  ? · You passed out (lost consciousness). ? · You have severe difficulty breathing. ? · You have symptoms of a heart attack. These may include:  ¨ Chest pain or pressure, or a strange feeling in your chest.  ¨ Sweating. ¨ Shortness of breath. ¨ Nausea or vomiting. ¨ Pain, pressure, or a strange feeling in your back, neck, jaw, or upper belly or in one or both shoulders or arms. ¨ Lightheadedness or sudden weakness. ¨ A fast or irregular heartbeat.   After you call 911, the  may tell you to chew 1 adult-strength or 2 to 4 low-dose aspirin. Wait for an ambulance. Do not try to drive yourself. ?Call your doctor today if:  ? · You have any trouble breathing. ? · Your chest pain gets worse. ? · You are dizzy or lightheaded, or you feel like you may faint. ? · You are not getting better as expected. ? · You are having new or different chest pain. Where can you learn more? Go to https://Mpayy.CorpU. org and sign in to your TroopSwap account. Enter A120 in the [employer] box to learn more about \"Chest Pain: Care Instructions. \"     If you do not have an account, please click on the \"Sign Up Now\" link. Current as of: March 20, 2017  Content Version: 11.4  © 7986-3708 [employer]. Care instructions adapted under license by [facility] ([facility]). If you have questions about a medical condition or this instruction, always ask your healthcare professional. [place] [address] any warranty or liability for your use of this information. Patient Education        Musculoskeletal Chest Pain: Care Instructions  Your Care Instructions    Chest pain is not always a sign that something is wrong with your heart or that you have another serious problem. The doctor thinks your chest pain is caused by strained muscles or ligaments, inflamed chest cartilage, or another problem in your chest, rather than by your heart. You may need more tests to find the cause of your chest pain. Follow-up care is a key part of your treatment and safety. Be sure to make and go to all appointments, and call your doctor if you are having problems. It's also a good idea to know your test results and keep a list of the medicines you take. How can you care for yourself at home? · Take pain medicines exactly as directed. ¨ If the doctor gave you a prescription medicine for pain, take it as prescribed.   ¨ If you are not taking a prescription pain medicine, ask your doctor if you can take an over-the-counter medicine. · Rest and protect the sore area. · Stop, change, or take a break from any activity that may be causing your pain or soreness. · Put ice or a cold pack on the sore area for 10 to 20 minutes at a time. Try to do this every 1 to 2 hours for the next 3 days (when you are awake) or until the swelling goes down. Put a thin cloth between the ice and your skin. · After 2 or 3 days, apply a heating pad set on low or a warm cloth to the area that hurts. Some doctors suggest that you go back and forth between hot and cold. · Do not wrap or tape your ribs for support. This may cause you to take smaller breaths, which could increase your risk of lung problems. · Mentholated creams such as Bengay or Icy Hot may soothe sore muscles. Follow the instructions on the package. · Follow your doctor's instructions for exercising. · Gentle stretching and massage may help you get better faster. Stretch slowly to the point just before pain begins, and hold the stretch for at least 15 to 30 seconds. Do this 3 or 4 times a day. Stretch just after you have applied heat. · As your pain gets better, slowly return to your normal activities. Any increased pain may be a sign that you need to rest a while longer. When should you call for help? Call 911 anytime you think you may need emergency care. For example, call if:  ? · You have chest pain or pressure. This may occur with:  ¨ Sweating. ¨ Shortness of breath. ¨ Nausea or vomiting. ¨ Pain that spreads from the chest to the neck, jaw, or one or both shoulders or arms. ¨ Dizziness or lightheadedness. ¨ A fast or uneven pulse. After calling 911, chew 1 adult-strength aspirin. Wait for an ambulance. Do not try to drive yourself. ? · You have sudden chest pain and shortness of breath, or you cough up blood. ?Call your doctor now or seek immediate medical care if:  ? · You have any trouble breathing. ? · Your chest pain gets worse.    ? · Your chest on your hand for 10 to 20 minutes at a time. Put a thin cloth between the ice and your skin. · Prop up the sore hand on a pillow when you ice it or anytime you sit or lie down during the next 3 days. Try to keep it above the level of your heart. This will help reduce swelling. · If your doctor recommends a sling, splint, or elastic bandage to support your hand, wear it as directed. When should you call for help? Call 911 anytime you think you may need emergency care. For example, call if:  ? · Your hand turns cool or pale or changes color. ?Call your doctor now or seek immediate medical care if:  ? · You cannot move your hand. ? · Your hand pops, moves out of its normal position, and then returns to its normal position. ? · You have signs of infection, such as:  ¨ Increased pain, swelling, warmth, or redness. ¨ Red streaks leading from the sore area. ¨ Pus draining from a place on your hand. ¨ A fever. ? · Your hand feels numb or tingly. ? Watch closely for changes in your health, and be sure to contact your doctor if:  ? · Your hand feels unstable when you try to use it. ? · You do not get better as expected. ? · You have any new symptoms, such as swelling. ? · Bruises from an injury to your hand last longer than 2 weeks. Where can you learn more? Go to https://soup.mepeTop Image Systems.NudgeRx. org and sign in to your ManageSocial account. Enter R273 in the [employer] box to learn more about \"Hand Pain: Care Instructions. \"     If you do not have an account, please click on the \"Sign Up Now\" link. Current as of: March 20, 2017  Content Version: 11.4  © 8057-6942 [employer]. Care instructions adapted under license by [address] If you have questions about a medical condition or this instruction, always ask your healthcare professional. [address] any warranty or liability for your use of this information. Please call for results later today.

## 2017-12-28 NOTE — PROGRESS NOTES
Senior Note:    Please see intern H&P for more details. In short, this is a 70-year-old male with past medical history significant for hyperlipidemia and postherpetic neuropathy who presents with a two-week history of left hand pain as well as left-sided chest discomfort. Symptoms have progressively worsened so patient went to his PCP today where troponin and EKG was done. Troponin was found to be elevated at 0.18 and was told to come to the ER for further evaluation. In the ER, troponin is still elevated at 0.15. Cardiology was consult it and recommended starting a heparin drip and keep the patient nothing by mouth at midnight for possible cath tomorrow. Patient has no history of stenting or CHF. 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     Principal Problem:    NSTEMI (non-ST elevated myocardial infarction) ([address]  Active Problems:    Peripheral neuropathic pain    Hyperlipidemia, mixed    Chest pain on exertion    Elevated troponin    Prediabetes    - admit to step-down  - IVF NS @ 75 ml/hr  - heparin gtt  - ASA/statin  - NPOMN  - resume home meds  - cardio following - possible cath tomorrow  - PT/OT    Talha Ayala MD  PGY-3 Internal Medicine Resident  [address]  12/28/2017

## 2017-12-28 NOTE — PROGRESS NOTES
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 There was no injury mechanism. The pain is present in the left hand. The quality of the pain is described as aching (crushing pain. ). The pain is severe. The pain has been intermittent since the incident. Associated symptoms include chest pain. Pertinent negatives include no numbness or tingling. Associated symptoms comments: States that he has localized chest muscle pain tender to palpation on the left 4th/5th rib. Had emesis the first night after surgery in that area. Pain is worse when his hand bothers him. . Nothing aggravates the symptoms. He has tried ice and NSAIDs (percocet) for the symptoms. The treatment provided moderate relief. Hand right hand surgery 12/7/17 recovering well. Had an IV in his left hand at the time of surgery. Pain very severe at times. Patient states that the pre op EKG showed no changes He had a negative stress test 2006.   Past Medical History:   Diagnosis Date    Fall 08/2013    FRACTURE HUMERAL HEAD    History of shoulder surgery 08/2013    MANIPULATION UNDER ANESTHESIA    Hyperlipidemia, mixed 6/2/2016    ON RX    Kidney stone 04/2015    PASSED ON OWN    Neuropathy ([facility] [address] 2007    BILAT FEET    Shingles 02/2007      Past Surgical History:   Procedure Laterality Date    BACK SURGERY  03/1989    LAMINOTOMY L4-5    FINGER TRIGGER RELEASE Right 12/07/2017    middle    HAND SURGERY Right 2006    RING TRIGGER FINGER    [place] INCISE FINGER TENDON SHEATH Right 12/7/2017    RELEASE A-ONE PULLEY INDEX, MIDDLE FINGERS performed by A Mono Dorado MD at [facility] 1960       Family History   Problem Relation Age of Onset    Heart Disease Father     Heart Attack Father     Cancer Sister      BRAIN MENIGIOMA    Cancer Sister      PANCREATIC,GLYOMA       Social History   Substance Use Topics    Smoking status: Never Smoker    Smokeless tobacco: Never Used    Alcohol use Yes      Comment: BEER WINE OR MIXED DRINKS 6 BEERS A MONTH WINE OR MIXED DRIINKS  SELDOM      Current Outpatient Prescriptions   Medication Sig Dispense Refill    oxyCODONE-acetaminophen (PERCOCET) 5-325 MG per tablet Take 1 tablet by mouth every 4 hours as needed for Pain .  rosuvastatin (CRESTOR) 10 MG tablet TAKE 1 TABLET DAILY 90 tablet 2    pregabalin (LYRICA) 150 MG capsule Take 1 capsule by mouth 2 times daily 180 capsule 1     No current facility-administered medications for this visit. Allergies   Allergen Reactions    Seasonal      Sneezing cough       Health Maintenance   Topic Date Due    Flu vaccine (1) 08/01/2018 (Originally 9/1/2017)    DTaP/Tdap/Td vaccine (1 - Tdap) 01/01/2025 (Originally 12/6/1970)    Pneumococcal low/med risk (1 of 2 - PCV13) 01/01/2025 (Originally 12/6/2016)    Diabetes screen  09/22/2019    Lipid screen  09/22/2021    Colon cancer screen colonoscopy  01/01/2024    Zostavax vaccine  Completed    Hepatitis C screen  Addressed       Subjective:      Review of Systems   Constitutional: Negative for activity change, appetite change, chills, diaphoresis, fatigue and fever. HENT: Negative for congestion, ear discharge, ear pain, hearing loss, postnasal drip, rhinorrhea, sinus pressure, sneezing, sore throat, trouble swallowing and voice change. Eyes: Negative for discharge, redness, itching and visual disturbance. Respiratory: Negative for cough, chest tightness, shortness of breath and wheezing. Cardiovascular: Positive for chest pain.         Left mid chest.   Gastrointestinal: Negative for abdominal pain, constipation, diarrhea, nausea and vomiting. Genitourinary: Negative for decreased urine volume, dysuria, flank pain and frequency. Musculoskeletal: Negative for back pain, neck pain and neck stiffness. Left hand pain   Skin: Negative for rash. Neurological: Negative for dizziness, tingling, weakness, light-headedness, numbness and headaches. Hematological: Negative for adenopathy. Psychiatric/Behavioral: The patient is not nervous/anxious. Objective:     Physical Exam   Constitutional: He is oriented to person, place, and time. He appears well-developed and well-nourished. No distress. HENT:   Head: Normocephalic. Right Ear: External ear normal.   Left Ear: External ear normal.   Nose: Nose normal.   Mouth/Throat: Oropharynx is clear and moist. No oropharyngeal exudate. Eyes: Conjunctivae and EOM are normal. Pupils are equal, round, and reactive to light. Right eye exhibits no discharge. Left eye exhibits no discharge. Neck: Normal range of motion. Neck supple. Cardiovascular: Normal rate, regular rhythm and normal heart sounds. No murmur heard. Pulmonary/Chest: Effort normal and breath sounds normal. No respiratory distress. He has no wheezes. He has no rales. Abdominal: Soft. Bowel sounds are normal. He exhibits no distension and no mass. There is no tenderness. Musculoskeletal: He exhibits tenderness. He exhibits no edema. Arms:       Left hand: He exhibits tenderness. He exhibits normal range of motion, normal capillary refill and no swelling. Normal sensation noted. Normal strength noted. Left 4th rib tender anteriorly mild. Lymphadenopathy:     He has no cervical adenopathy. Neurological: He is alert and oriented to person, place, and time. No cranial nerve deficit. Coordination normal.   Skin: Skin is warm. No rash noted. He is not diaphoretic. No erythema. Psychiatric: He has a normal mood and affect.  His behavior is normal. Judgment

## 2017-12-28 NOTE — H&P
icterus no redness  Psych-normal affect   ·      DIAGNOSTICS      Laboratory Testing:  CBC:   Recent Labs      12/28/17   1457   WBC  7.8   HGB  16.9   PLT  179     BMP:    Recent Labs      12/28/17   1209  12/28/17   1457   NA  141  134*   K  4.6  4.2   CL  103  100   CO2  28  21   BUN  17  17   CREATININE  0.77  0.68*   GLUCOSE  110*  139*     S. Calcium:  Recent Labs      12/28/17   1457   CALCIUM  9.1     S. Ionized Calcium:No results for input(s): IONCA in the last 72 hours. S. Magnesium:No results for input(s): MG in the last 72 hours. S. Phosphorus:No results for input(s): PHOS in the last 72 hours. S. Glucose:No results for input(s): POCGLU in the last 72 hours. Glycosylated hemoglobin A1C: No results for input(s): LABA1C in the last 72 hours. INR: No results for input(s): INR in the last 72 hours. Hepatic functions: No results for input(s): ALKPHOS, ALT, AST, PROT, BILITOT, BILIDIR, LABALBU in the last 72 hours. Pancreatic functions:No results for input(s): LACTA, AMYLASE in the last 72 hours. S. Lactic Acid: No results for input(s): LACTA in the last 72 hours. Cardiac enzymes:No results for input(s): CKTOTAL, CKMB, CKMBINDEX, TROPONINI in the last 72 hours. BNP:No results for input(s): BNP in the last 72 hours. Lipid profile: No results for input(s): CHOL, TRIG, HDL, LDLCALC in the last 72 hours.     Invalid input(s): LDL  Blood Gases: No results found for: PH, PCO2, PO2, HCO3, O2SAT  Thyroid functions: No results found for: TSH     Imaging/Diagonstics:      CXR:    ASSESSMENT     Patient Active Problem List   Diagnosis    Peripheral neuropathic pain    Hyperlipidemia, mixed    Hyperglycemia    Trigger index finger of right hand    Trigger middle finger of right hand    NSTEMI (non-ST elevated myocardial infarction) ([facility] [address]    Chest pain on exertion       PLAN    Principal Problem:    NSTEMI (non-ST elevated myocardial infarction) ([facility] [address]  Active Problems:    Peripheral neuropathic pain

## 2017-12-28 NOTE — ED PROVIDER NOTES
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 Patient states for the past few days he's had left hand pain and went to his primary care physician's office today and had an EKG as well as troponins drawn. He states he was called because his troponin was elevated and was told to be evaluated in the emergency department. 19 has had some left-sided crampy chest wall pain which she thought was musculoskeletal in origin. Patient does not have any risk factors for heart disease except for family history. He does not smoke, use cocaine, drink alcohol, and does not have any history of hypertension or diabetes. He is well-appearing and nontoxic and is currently asymptomatic of chest pain or shortness of breath. PAST MEDICAL / SURGICAL / SOCIAL / FAMILY HISTORY      has a past medical history of Fall; History of shoulder surgery; Hyperlipidemia, mixed; Kidney stone; Neuropathy ([address] and Shingles. has a past surgical history that includes back surgery (03/1989); Tonsillectomy (1954); Hand surgery (Right, 2006); Finger trigger release (Right, 12/07/2017); and incise finger tendon sheath (Right, 12/7/2017). Social History     Social History    Marital status:      Spouse name: N/A    Number of children: N/A    Years of education: N/A     Occupational History    Not on file.      Social History Main Topics    Smoking status: Never Smoker    Smokeless tobacco: Never Used    Alcohol use Yes      Comment: BEER WINE OR MIXED DRINKS 6 BEERS A 7.8 3.5 - 11.3 k/uL    RBC 5.41 4.21 - 5.77 m/uL    Hemoglobin 16.9 13.0 - 17.0 g/dL    Hematocrit 49.9 40.7 - 50.3 %    MCV 92.2 82.6 - 102.9 fL    MCH 31.2 25.2 - 33.5 pg    MCHC 33.9 28.4 - 34.8 g/dL    RDW 12.3 11.8 - 14.4 %    Platelets 475 854 - 874 k/uL    MPV 10.9 8.1 - 13.5 fL    Differential Type NOT REPORTED     Seg Neutrophils 61 36 - 65 %    Lymphocytes 26 24 - 43 %    Monocytes 9 3 - 12 %    Eosinophils % 3 1 - 4 %    Basophils 1 0 - 2 %    Immature Granulocytes 0 0 %    Segs Absolute 4.83 1.50 - 8.10 k/uL    Absolute Lymph # 2.04 1.10 - 3.70 k/uL    Absolute Mono # 0.66 0.10 - 1.20 k/uL    Absolute Eos # 0.20 0.00 - 0.44 k/uL    Basophils # 0.06 0.00 - 0.20 k/uL    Absolute Immature Granulocyte <0.03 0.00 - 0.30 k/uL    WBC Morphology NOT REPORTED     RBC Morphology NOT REPORTED     Platelet Estimate NOT REPORTED    Basic Metabolic Panel   Result Value Ref Range    Glucose 139 (H) 70 - 99 mg/dL    BUN 17 8 - 23 mg/dL    CREATININE 0.68 (L) 0.70 - 1.20 mg/dL    Bun/Cre Ratio NOT REPORTED 9 - 20    Calcium 9.1 8.6 - 10.4 mg/dL    Sodium 134 (L) 135 - 144 mmol/L    Potassium 4.2 3.7 - 5.3 mmol/L    Chloride 100 98 - 107 mmol/L    CO2 21 20 - 31 mmol/L    Anion Gap 13 9 - 17 mmol/L    GFR Non-African American >60 >60 mL/min    GFR African American >60 >60 mL/min    GFR Comment          GFR Staging NOT REPORTED    Troponin   Result Value Ref Range    Troponin T 0.15 (HH) <0.03 ng/mL    Troponin Interp             IMPRESSION: NSTEMI    RADIOLOGY:  Xr Chest Standard (2 Vw)    Result Date: 12/28/2017  EXAMINATION: TWO VIEWS OF THE CHEST 12/28/2017 10:45 am COMPARISON: None. HISTORY: ORDERING SYSTEM PROVIDED HISTORY: Chest wall pain TECHNOLOGIST PROVIDED HISTORY: Reason for exam:->pain FINDINGS: There is no focal airspace consolidation, pleural effusion or pneumothorax. The cardiac silhouette appears mildly enlarged, but there is no pulmonary vascular congestion.   Visualized osseous structures appear intact, portions of this note were completed with a voice recognition program.  Efforts were made to edit the dictations but occasionally words are mis-transcribed.)       Lyssa Lubin DO  Resident  12/28/17 0504

## 2017-12-29 LAB
ABSOLUTE EOS #: 0.3 K/UL (ref 0–0.44)
ABSOLUTE IMMATURE GRANULOCYTE: <0.03 K/UL (ref 0–0.3)
ABSOLUTE LYMPH #: 2.67 K/UL (ref 1.1–3.7)
ABSOLUTE MONO #: 0.56 K/UL (ref 0.1–1.2)
ANION GAP SERPL CALCULATED.3IONS-SCNC: 12 MMOL/L (ref 9–17)
BASOPHILS # BLD: 1 % (ref 0–2)
BASOPHILS ABSOLUTE: 0.04 K/UL (ref 0–0.2)
BUN BLDV-MCNC: 12 MG/DL (ref 8–23)
BUN/CREAT BLD: ABNORMAL (ref 9–20)
CALCIUM SERPL-MCNC: 8.1 MG/DL (ref 8.6–10.4)
CHLORIDE BLD-SCNC: 103 MMOL/L (ref 98–107)
CO2: 22 MMOL/L (ref 20–31)
CREAT SERPL-MCNC: 0.67 MG/DL (ref 0.7–1.2)
DIFFERENTIAL TYPE: ABNORMAL
EOSINOPHILS RELATIVE PERCENT: 5 % (ref 1–4)
GFR AFRICAN AMERICAN: >60 ML/MIN
GFR NON-AFRICAN AMERICAN: >60 ML/MIN
GFR SERPL CREATININE-BSD FRML MDRD: ABNORMAL ML/MIN/{1.73_M2}
GFR SERPL CREATININE-BSD FRML MDRD: ABNORMAL ML/MIN/{1.73_M2}
GLUCOSE BLD-MCNC: 94 MG/DL (ref 70–99)
HCT VFR BLD CALC: 44.6 % (ref 40.7–50.3)
HEMOGLOBIN: 15.1 G/DL (ref 13–17)
IMMATURE GRANULOCYTES: 0 %
INR BLD: 1
LYMPHOCYTES # BLD: 42 % (ref 24–43)
MCH RBC QN AUTO: 31.6 PG (ref 25.2–33.5)
MCHC RBC AUTO-ENTMCNC: 33.9 G/DL (ref 28.4–34.8)
MCV RBC AUTO: 93.3 FL (ref 82.6–102.9)
MONOCYTES # BLD: 9 % (ref 3–12)
PARTIAL THROMBOPLASTIN TIME: 58.4 SEC (ref 21.3–31.3)
PDW BLD-RTO: 12.4 % (ref 11.8–14.4)
PLATELET # BLD: ABNORMAL K/UL (ref 138–453)
PLATELET ESTIMATE: ABNORMAL
PLATELET, FLUORESCENCE: 125 K/UL (ref 138–453)
PLATELET, IMMATURE FRACTION: 3.8 % (ref 1.1–10.3)
PMV BLD AUTO: ABNORMAL FL (ref 8.1–13.5)
POTASSIUM SERPL-SCNC: 4.2 MMOL/L (ref 3.7–5.3)
PROTHROMBIN TIME: 11.2 SEC (ref 9.4–12.6)
RBC # BLD: 4.78 M/UL (ref 4.21–5.77)
RBC # BLD: ABNORMAL 10*6/UL
SEG NEUTROPHILS: 43 % (ref 36–65)
SEGMENTED NEUTROPHILS ABSOLUTE COUNT: 2.71 K/UL (ref 1.5–8.1)
SODIUM BLD-SCNC: 137 MMOL/L (ref 135–144)
TROPONIN INTERP: ABNORMAL
TROPONIN T: 0.15 NG/ML
WBC # BLD: 6.3 K/UL (ref 3.5–11.3)
WBC # BLD: ABNORMAL 10*3/UL

## 2017-12-29 PROCEDURE — 6370000000 HC RX 637 (ALT 250 FOR IP)

## 2017-12-29 PROCEDURE — C1874 STENT, COATED/COV W/DEL SYS: HCPCS

## 2017-12-29 PROCEDURE — C1725 CATH, TRANSLUMIN NON-LASER: HCPCS

## 2017-12-29 PROCEDURE — 85610 PROTHROMBIN TIME: CPT

## 2017-12-29 PROCEDURE — C1894 INTRO/SHEATH, NON-LASER: HCPCS

## 2017-12-29 PROCEDURE — C1769 GUIDE WIRE: HCPCS

## 2017-12-29 PROCEDURE — C1760 CLOSURE DEV, VASC: HCPCS

## 2017-12-29 PROCEDURE — 027034Z DILATION OF CORONARY ARTERY, ONE ARTERY WITH DRUG-ELUTING INTRALUMINAL DEVICE, PERCUTANEOUS APPROACH: ICD-10-PCS | Performed by: INTERNAL MEDICINE

## 2017-12-29 PROCEDURE — 2580000003 HC RX 258: Performed by: INTERNAL MEDICINE

## 2017-12-29 PROCEDURE — 85025 COMPLETE CBC W/AUTO DIFF WBC: CPT

## 2017-12-29 PROCEDURE — C1887 CATHETER, GUIDING: HCPCS

## 2017-12-29 PROCEDURE — 6370000000 HC RX 637 (ALT 250 FOR IP): Performed by: INTERNAL MEDICINE

## 2017-12-29 PROCEDURE — 92943 PRQ TRLUML REVSC CH OCC ANT: CPT | Performed by: INTERNAL MEDICINE

## 2017-12-29 PROCEDURE — B2111ZZ FLUOROSCOPY OF MULTIPLE CORONARY ARTERIES USING LOW OSMOLAR CONTRAST: ICD-10-PCS | Performed by: INTERNAL MEDICINE

## 2017-12-29 PROCEDURE — 7100000011 HC PHASE II RECOVERY - ADDTL 15 MIN

## 2017-12-29 PROCEDURE — B2151ZZ FLUOROSCOPY OF LEFT HEART USING LOW OSMOLAR CONTRAST: ICD-10-PCS | Performed by: INTERNAL MEDICINE

## 2017-12-29 PROCEDURE — 85730 THROMBOPLASTIN TIME PARTIAL: CPT

## 2017-12-29 PROCEDURE — 6370000000 HC RX 637 (ALT 250 FOR IP): Performed by: STUDENT IN AN ORGANIZED HEALTH CARE EDUCATION/TRAINING PROGRAM

## 2017-12-29 PROCEDURE — 1200000000 HC SEMI PRIVATE

## 2017-12-29 PROCEDURE — 6360000002 HC RX W HCPCS

## 2017-12-29 PROCEDURE — 93458 L HRT ARTERY/VENTRICLE ANGIO: CPT | Performed by: INTERNAL MEDICINE

## 2017-12-29 PROCEDURE — 7100000010 HC PHASE II RECOVERY - FIRST 15 MIN

## 2017-12-29 PROCEDURE — 36415 COLL VENOUS BLD VENIPUNCTURE: CPT

## 2017-12-29 PROCEDURE — 4A023N7 MEASUREMENT OF CARDIAC SAMPLING AND PRESSURE, LEFT HEART, PERCUTANEOUS APPROACH: ICD-10-PCS | Performed by: INTERNAL MEDICINE

## 2017-12-29 PROCEDURE — 99223 1ST HOSP IP/OBS HIGH 75: CPT | Performed by: INTERNAL MEDICINE

## 2017-12-29 PROCEDURE — 94762 N-INVAS EAR/PLS OXIMTRY CONT: CPT

## 2017-12-29 PROCEDURE — 80048 BASIC METABOLIC PNL TOTAL CA: CPT

## 2017-12-29 PROCEDURE — 84484 ASSAY OF TROPONIN QUANT: CPT

## 2017-12-29 RX ORDER — SODIUM CHLORIDE 9 MG/ML
INJECTION, SOLUTION INTRAVENOUS CONTINUOUS
Status: DISCONTINUED | OUTPATIENT
Start: 2017-12-29 | End: 2017-12-30

## 2017-12-29 RX ORDER — LISINOPRIL 5 MG/1
5 TABLET ORAL DAILY
Status: DISCONTINUED | OUTPATIENT
Start: 2017-12-29 | End: 2017-12-30

## 2017-12-29 RX ORDER — SODIUM CHLORIDE 0.9 % (FLUSH) 0.9 %
10 SYRINGE (ML) INJECTION PRN
Status: DISCONTINUED | OUTPATIENT
Start: 2017-12-29 | End: 2017-12-30 | Stop reason: HOSPADM

## 2017-12-29 RX ORDER — SODIUM CHLORIDE 0.9 % (FLUSH) 0.9 %
10 SYRINGE (ML) INJECTION EVERY 12 HOURS SCHEDULED
Status: DISCONTINUED | OUTPATIENT
Start: 2017-12-29 | End: 2017-12-30 | Stop reason: HOSPADM

## 2017-12-29 RX ORDER — ASPIRIN 81 MG/1
81 TABLET ORAL DAILY
Status: DISCONTINUED | OUTPATIENT
Start: 2017-12-30 | End: 2017-12-30 | Stop reason: HOSPADM

## 2017-12-29 RX ADMIN — SODIUM CHLORIDE: 9 INJECTION, SOLUTION INTRAVENOUS at 21:23

## 2017-12-29 RX ADMIN — LISINOPRIL 5 MG: 5 TABLET ORAL at 21:23

## 2017-12-29 RX ADMIN — METOPROLOL TARTRATE 12.5 MG: 25 TABLET ORAL at 21:19

## 2017-12-29 RX ADMIN — SODIUM CHLORIDE: 9 INJECTION, SOLUTION INTRAVENOUS at 02:31

## 2017-12-29 RX ADMIN — PREGABALIN 150 MG: 75 CAPSULE ORAL at 21:22

## 2017-12-29 RX ADMIN — Medication 10 ML: at 21:22

## 2017-12-29 ASSESSMENT — PAIN SCALES - GENERAL: PAINLEVEL_OUTOF10: 0

## 2017-12-29 NOTE — PROGRESS NOTES
[facility]    Progress note             Date:   12/29/2017  Patient name:  Nicola Sanchez MD  Date of admission:  12/28/2017  2:34 PM  MRN:   [id number]  YOB: 1950    CHIEF COMPLAINT     History Obtained From:  Patient and chart review. HISTORY OF PRESENT ILLNESS      The patient is a 78 y.o.  male who is admitted to our service after he came with left sided chest pain  And left hand pain. 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 He visited his PCP his morning, chest  X ray was done and troponin was run came out 0.18, he cam to ED. EKG was normal, POC troponin in ED 0.15 , he was started on low dose heparin protocol and cardiology was called. He has PMH of post herpetic bilateral feet neuropathy, taking lyrica, Non smoke, not alcohol abuser, prediabetic. In ED he was laying flat , alert oriented, comfortable, chest pain improved, saturation well on room air. Denies anySOB , cough, palpitation , dizziness, leg swelling or abdomenal pain. Interval Hx :     Patient seen and chart reviewed  Hemodynamically stable, afebrile  No acute issues over night  Chest pain resolved, no nausea, vomiting   On optimum medication  NPO since midnight   Possible Cardica cath today       PAST MEDICAL HISTORY       has a past medical history of Fall; History of shoulder surgery; Hyperlipidemia, mixed; Kidney stone; Neuropathy ([facility] [address] and Shingles. PAST SURGICAL HISTORY       has a past surgical history that includes back surgery (03/1989); Tonsillectomy (1954); Hand surgery (Right, 2006); Finger trigger release (Right, 12/07/2017); and incise finger tendon sheath (Right, 12/7/2017).      HOME MEDICATIONS extremities normal, atraumatic, no cyanosis or edema  Neurological:  Awake, alert, oriented to name, place and time. Cranial nerves II-XII are grossly intact. Reflexes normal and symmetric. Sensation grossly normal  Eye no icterus no redness  Psych-normal affect   ·      DIAGNOSTICS      Laboratory Testing:  CBC:   Recent Labs      12/29/17   0550   WBC  6.3   HGB  15.1   PLT  See Reflexed IPF Result     BMP:    Recent Labs      12/28/17   1209  12/28/17   1457  12/29/17   0550   NA  141  134*  137   K  4.6  4.2  4.2   CL  103  100  103   CO2  28  21  22   BUN  17 17  12   CREATININE  0.77  0.68*  0.67*   GLUCOSE  110*  139*  94     S. Calcium:  Recent Labs      12/29/17   0550   CALCIUM  8.1*     S. Ionized Calcium:No results for input(s): IONCA in the last 72 hours. S. Magnesium:No results for input(s): MG in the last 72 hours. S. Phosphorus:No results for input(s): PHOS in the last 72 hours. S. Glucose:No results for input(s): POCGLU in the last 72 hours. Glycosylated hemoglobin A1C:   Recent Labs      12/28/17 1958   LABA1C  6.3*     INR:   Recent Labs      12/29/17   0550   INR  1.0     Hepatic functions: No results for input(s): ALKPHOS, ALT, AST, PROT, BILITOT, BILIDIR, LABALBU in the last 72 hours. Pancreatic functions:No results for input(s): LACTA, AMYLASE in the last 72 hours. S. Lactic Acid: No results for input(s): LACTA in the last 72 hours. Cardiac enzymes:No results for input(s): CKTOTAL, CKMB, CKMBINDEX, TROPONINI in the last 72 hours. BNP:No results for input(s): BNP in the last 72 hours.   Lipid profile:   Recent Labs      12/28/17 1958   CHOL  135   TRIG  144   HDL  44     Blood Gases: No results found for: PH, PCO2, PO2, HCO3, O2SAT  Thyroid functions: No results found for: TSH     Imaging/Diagonstics:      CXR:    ASSESSMENT     Patient Active Problem List   Diagnosis    Peripheral neuropathic pain    Hyperlipidemia, mixed    Hyperglycemia    Trigger index finger of right hand

## 2017-12-29 NOTE — PROGRESS NOTES
Patient arrived from in-house bed, consent signed, all questions answered. Pt ready for procedure. Call light to reach with side rails up 2 of 2. Bilat groin areas clipped. No one at bedside with patient.

## 2017-12-29 NOTE — CONSULTS
[facility]  consult note                  Date:   12/29/2017  Patient name: Raul Mcmahon MD  Date of admission:  12/28/2017  2:34 PM  MRN:   [id number]  YOB: 1950    Reason for Admission: chest pain, NSTEMI    CHIEF COMPLAINT:   Chest pain    History Obtained From:  Patient and chart review     HISTORY OF PRESENT ILLNESS:      Patient is a 79 yo male with   Past medical history of hyperlipidemia, kidney stones presented with chest pain. Chest discomfort located on palpation of 4 th inter coastal space. Pain is reproducible. Also complains pain the left hand. Improves with percocet. Has also been having chest pain on and off with exertion. Found to have elevated troponin. Non smoker and non alcohol. Pre diabetic. EKG NSR left axis deviation. Low voltage. Workup in the ER showed Trop 0.18-->0.13--->0. 15.   normotensive and HR 60-70. Afebrile  Risk factors: MI in father age 48, hyperlipidemia    Past Medical History:   has a past medical history of Fall; History of shoulder surgery; Hyperlipidemia, mixed; Kidney stone; Neuropathy ([address] and Shingles. Past Surgical History:   has a past surgical history that includes back surgery (03/1989); Tonsillectomy (1954); Hand surgery (Right, 2006); Finger trigger release (Right, 12/07/2017); and incise finger tendon sheath (Right, 12/7/2017). Home Medications:    Prior to Admission medications    Medication Sig Start Date End Date Taking? Authorizing Provider   oxyCODONE-acetaminophen (PERCOCET) 5-325 MG per tablet Take 1 tablet by mouth every 4 hours as needed for Pain . Historical Provider, MD   rosuvastatin (CRESTOR) 10 MG tablet TAKE 1 TABLET DAILY 8/14/17   Nettie Hannah MD   pregabalin (LYRICA) 150 MG capsule Take 1 capsule by mouth 2 times daily 5/11/17   Nettie Hannah MD       Allergies:  Seasonal    Social History:   reports that he has never smoked.  He has never used smokeless tobacco. He reports that he

## 2017-12-30 VITALS
HEART RATE: 70 BPM | SYSTOLIC BLOOD PRESSURE: 82 MMHG | BODY MASS INDEX: 31.22 KG/M2 | WEIGHT: 223 LBS | HEIGHT: 71 IN | OXYGEN SATURATION: 97 % | DIASTOLIC BLOOD PRESSURE: 51 MMHG | RESPIRATION RATE: 21 BRPM | TEMPERATURE: 97.5 F

## 2017-12-30 LAB
ANION GAP SERPL CALCULATED.3IONS-SCNC: 11 MMOL/L (ref 9–17)
BUN BLDV-MCNC: 9 MG/DL (ref 8–23)
BUN/CREAT BLD: ABNORMAL (ref 9–20)
CALCIUM SERPL-MCNC: 7.7 MG/DL (ref 8.6–10.4)
CHLORIDE BLD-SCNC: 104 MMOL/L (ref 98–107)
CO2: 21 MMOL/L (ref 20–31)
CREAT SERPL-MCNC: 0.8 MG/DL (ref 0.7–1.2)
EKG ATRIAL RATE: 56 BPM
EKG P AXIS: 21 DEGREES
EKG P-R INTERVAL: 176 MS
EKG Q-T INTERVAL: 456 MS
EKG QRS DURATION: 74 MS
EKG QTC CALCULATION (BAZETT): 440 MS
EKG R AXIS: -12 DEGREES
EKG T AXIS: 11 DEGREES
EKG VENTRICULAR RATE: 56 BPM
GFR AFRICAN AMERICAN: >60 ML/MIN
GFR NON-AFRICAN AMERICAN: >60 ML/MIN
GFR SERPL CREATININE-BSD FRML MDRD: ABNORMAL ML/MIN/{1.73_M2}
GFR SERPL CREATININE-BSD FRML MDRD: ABNORMAL ML/MIN/{1.73_M2}
GLUCOSE BLD-MCNC: 68 MG/DL (ref 70–99)
HCT VFR BLD CALC: 44.2 % (ref 40.7–50.3)
HEMOGLOBIN: 14.6 G/DL (ref 13–17)
MCH RBC QN AUTO: 30.8 PG (ref 25.2–33.5)
MCHC RBC AUTO-ENTMCNC: 33 G/DL (ref 28.4–34.8)
MCV RBC AUTO: 93.2 FL (ref 82.6–102.9)
PDW BLD-RTO: 12.7 % (ref 11.8–14.4)
PLATELET # BLD: 143 K/UL (ref 138–453)
PMV BLD AUTO: 11.2 FL (ref 8.1–13.5)
POTASSIUM SERPL-SCNC: 3.9 MMOL/L (ref 3.7–5.3)
RBC # BLD: 4.74 M/UL (ref 4.21–5.77)
SODIUM BLD-SCNC: 136 MMOL/L (ref 135–144)
WBC # BLD: 6.5 K/UL (ref 3.5–11.3)

## 2017-12-30 PROCEDURE — 6370000000 HC RX 637 (ALT 250 FOR IP): Performed by: INTERNAL MEDICINE

## 2017-12-30 PROCEDURE — 93005 ELECTROCARDIOGRAM TRACING: CPT

## 2017-12-30 PROCEDURE — 99239 HOSP IP/OBS DSCHRG MGMT >30: CPT | Performed by: INTERNAL MEDICINE

## 2017-12-30 PROCEDURE — 36415 COLL VENOUS BLD VENIPUNCTURE: CPT

## 2017-12-30 PROCEDURE — 80048 BASIC METABOLIC PNL TOTAL CA: CPT

## 2017-12-30 PROCEDURE — 85027 COMPLETE CBC AUTOMATED: CPT

## 2017-12-30 RX ORDER — 0.9 % SODIUM CHLORIDE 0.9 %
250 INTRAVENOUS SOLUTION INTRAVENOUS ONCE
Status: DISCONTINUED | OUTPATIENT
Start: 2017-12-30 | End: 2017-12-30

## 2017-12-30 RX ORDER — ASPIRIN 81 MG/1
81 TABLET ORAL DAILY
Qty: 30 TABLET | Refills: 3 | Status: SHIPPED | OUTPATIENT
Start: 2017-12-31

## 2017-12-30 RX ORDER — ROSUVASTATIN CALCIUM 10 MG/1
40 TABLET, COATED ORAL DAILY
Status: DISCONTINUED | OUTPATIENT
Start: 2017-12-31 | End: 2017-12-30 | Stop reason: HOSPADM

## 2017-12-30 RX ORDER — LISINOPRIL 2.5 MG/1
2.5 TABLET ORAL DAILY
Qty: 30 TABLET | Refills: 3 | Status: SHIPPED | OUTPATIENT
Start: 2017-12-31 | End: 2018-01-02 | Stop reason: SDUPTHER

## 2017-12-30 RX ORDER — LISINOPRIL 2.5 MG/1
2.5 TABLET ORAL DAILY
Status: DISCONTINUED | OUTPATIENT
Start: 2017-12-31 | End: 2017-12-30 | Stop reason: HOSPADM

## 2017-12-30 RX ORDER — ROSUVASTATIN CALCIUM 40 MG/1
40 TABLET, COATED ORAL DAILY
Qty: 30 TABLET | Refills: 3 | Status: SHIPPED | OUTPATIENT
Start: 2017-12-31 | End: 2018-01-02 | Stop reason: SDUPTHER

## 2017-12-30 RX ADMIN — Medication 81 MG: at 07:34

## 2017-12-30 RX ADMIN — TICAGRELOR 90 MG: 90 TABLET ORAL at 07:34

## 2017-12-30 NOTE — PROGRESS NOTES
Writer informed pt BP dropped to 77/40, was rechecked 87/51. Pt was asymptomatic.  Will hold morning dose of lopressor and inform primary team.

## 2017-12-30 NOTE — PROGRESS NOTES
[facility]   Progress Note                   Date:   12/30/2017  Patient name: Shaila Andino MD  Date of admission:  12/28/2017  2:34 PM  MRN:   [id number]  YOB: 1950  PCP: Jenni Saucedo MD    Reason for Admission:     Subjective:     No events overnight, patient denies any chest pain, shortness of breath or palpitations  Patient's blood pressure on the lower side this a.m. Patient systolic blood pressure generally runs in 90s  Patient got cardiac cath done yesterday and got drug-eluting stent placed to the LAD       Intake/Output Summary (Last 24 hours) at 12/30/17 0740  Last data filed at 12/30/17 0651   Gross per 24 hour   Intake              678 ml   Output              700 ml   Net              -22 ml       Medications:   Scheduled Meds:   metoprolol tartrate  12.5 mg Oral BID    sodium chloride flush  10 mL Intravenous 2 times per day    aspirin  81 mg Oral Daily    lisinopril  5 mg Oral Daily    ticagrelor  90 mg Oral BID    sodium chloride flush  10 mL Intravenous 2 times per day    pregabalin  150 mg Oral BID    rosuvastatin  10 mg Oral Daily    sodium chloride flush  10 mL Intravenous 2 times per day     Continuous Infusions:   sodium chloride 75 mL/hr at 12/29/17 2123    sodium chloride 75 mL/hr at 12/29/17 0231     CBC:   Recent Labs      12/28/17   1457  12/29/17   0550  12/30/17   0552   WBC  7.8  6.3  6.5   HGB  16.9  15.1  14.6   PLT  179  See Reflexed IPF Result  143     BMP:  Recent Labs      12/28/17   1209  12/28/17   1457  12/29/17   0550   NA  141  134*  137   K  4.6  4.2  4.2   CL  103  100  103   CO2  28  21  22   BUN  17  17  12   CREATININE  0.77  0.68*  0.67*   GLUCOSE  110*  139*  94     Hepatic: No results for input(s): AST, ALT, ALB, BILITOT, ALKPHOS in the last 72 hours. Troponin: No results for input(s): TROPONINI in the last 72 hours. BNP: No results for input(s): BNP in the last 72 hours.   Lipids:   Recent Labs      12/28/17

## 2017-12-30 NOTE — PROGRESS NOTES
[facility] [address]  Occupational Therapy Not Seen Note    Patient not available for Occupational Therapy due to:    [] Testing:    [] Hemodialysis    [] Blood Transfusion in Progress    []Refusal by Patient:    [] Surgery/Procedure:    [] Strict Bedrest    [] Sedation    [] Spine Precautions     [] Pt being transferred to palliative care at this time. Spoke with pt/family and OT services to be defered. [x] Pt independent with functional mobility and functional tasks per pt.  Pt with no OT acute care needs at this time, will defer OT eval.    [] Other    Next Scheduled Treatment: Defer OT evaluation    Signature: Eber Long OTR/L

## 2017-12-30 NOTE — CARE COORDINATION
Discharge [facility] Case Management Department  Written by: Christa Nath RN    Patient Name: Betty De La Rosa MD  Attending Provider: No att. providers found  Admit Date: 2017  2:34 PM  MRN: [id number]  Account: [de-identified]                     : 1950  Discharge Date: 2017      Disposition: home    Christa Nath RN

## 2017-12-30 NOTE — PROGRESS NOTES
and rhythm, S1, S2 normal, no murmur  Abdomen: soft, non-tender; bowel sounds normal; no masses,  no organomegaly  Extremities: extremities normal, atraumatic, no cyanosis or edema  Neurological:  Awake, alert, oriented to name, place and time. Cranial nerves II-XII are grossly intact. Reflexes normal and symmetric. Sensation grossly normal  Eye no icterus no redness  Psych-normal affect   ·      DIAGNOSTICS      Laboratory Testing:  CBC:   Recent Labs      12/30/17   0552   WBC  6.5   HGB  14.6   PLT  143     BMP:    Recent Labs      12/28/17   1209  12/28/17   1457  12/29/17   0550   NA  141  134*  137   K  4.6  4.2  4.2   CL  103  100  103   CO2  28  21  22   BUN  17 17  12   CREATININE  0.77  0.68*  0.67*   GLUCOSE  110*  139*  94     S. Calcium:  Recent Labs      12/29/17   0550   CALCIUM  8.1*     S. Ionized Calcium:No results for input(s): IONCA in the last 72 hours. S. Magnesium:No results for input(s): MG in the last 72 hours. S. Phosphorus:No results for input(s): PHOS in the last 72 hours. S. Glucose:No results for input(s): POCGLU in the last 72 hours. Glycosylated hemoglobin A1C:   Recent Labs      12/28/17 1958   LABA1C  6.3*     INR:   Recent Labs      12/29/17   0550   INR  1.0     Hepatic functions: No results for input(s): ALKPHOS, ALT, AST, PROT, BILITOT, BILIDIR, LABALBU in the last 72 hours. Pancreatic functions:No results for input(s): LACTA, AMYLASE in the last 72 hours. S. Lactic Acid: No results for input(s): LACTA in the last 72 hours. Cardiac enzymes:No results for input(s): CKTOTAL, CKMB, CKMBINDEX, TROPONINI in the last 72 hours. BNP:No results for input(s): BNP in the last 72 hours.   Lipid profile:   Recent Labs      12/28/17 1958   CHOL  135   TRIG  144   HDL  44     Blood Gases: No results found for: PH, PCO2, PO2, HCO3, O2SAT  Thyroid functions: No results found for: TSH     Imaging/Diagonstics:      CXR:    ASSESSMENT     Patient Active Problem List   Diagnosis   

## 2017-12-30 NOTE — DISCHARGE SUMMARY
[address] [place] [address]     Patient ID: Halliethomas Power MD  :  1950   MRN: [id number]     ACCOUNT:  [de-identified]   Patient's PCP: Christopher Hopper MD  Admit Date: 2017   Discharge Date: 2017     Length of Stay: 2  Code Status:  Full Code  Admitting Physician: Phoebe Martin MD  Discharge Physician: Diogenes Raya MD     Active Discharge Diagnoses:     Primary Problem  NSTEMI (non-ST elevated myocardial infarction) [facility])      [facility] Problems    Diagnosis Date Noted    NSTEMI (non-ST elevated myocardial infarction) ([facility] [address] [I21.4] 2017    Chest pain on exertion [R07.9] 2017    Elevated troponin [R74.8] 2017    Prediabetes [R73.03] 2017    Hyperlipidemia, mixed [E78.2] 2016    Peripheral neuropathic pain [M79.2] 2016       Admission Condition:  poor     Discharged Condition: good    Hospital Stay:     Hospital Course: The patient is a 78 y.o.  male who is admitted to our service after he came with left sided chest pain  likely sharp left sided 10/10, radiating to hand, remained for 2 hours and partially relieved with percocet. He visited his PCP his morning, chest  X ray was done and troponin was run came out 0.18, he cam to ED. EKG was normal, POC troponin in ED 0.15 , he was started on low dose heparin protocol and cardiology was called. He has PMH of post herpetic bilateral feet neuropathy, taking lyrica, Non smoke, not alcohol abuser, prediabetic. He was admitted with NSTEMI, cardiac cath was done with OLIVA in LAD WITH 99% STENOSIS. Started on optimum medication , EF was 45%, WE started on Zestril 2.5 and hel BB as BP was on lower side. He need to follow PCP in 1 week for BP management and posibel start on BB.  Cardiology in a month.       Significant therapeutic interventions:   catheterization with OLIVA in Findings: EF was 45%, started on Zestril 2.5 and hel BB as BP was on lower side. He need to follow PCP in 1 week for BP management and posibel start on BB. Cardiology in a month.     Diet: low fat, low cholesterol diet     Activity: As tolerated    Discharge Medications:      Medication List      START taking these medications    aspirin 81 MG EC tablet  Take 1 tablet by mouth daily  Start taking on:  12/31/2017     lisinopril 2.5 MG tablet  Commonly known as:  PRINIVIL;ZESTRIL  Take 1 tablet by mouth daily  Start taking on:  12/31/2017     ticagrelor 90 MG Tabs tablet  Commonly known as:  BRILINTA  Take 1 tablet by mouth 2 times daily        CHANGE how you take these medications    rosuvastatin 40 MG tablet  Commonly known as:  CRESTOR  Take 1 tablet by mouth daily  Start taking on:  12/31/2017  What changed:  See the new instructions. CONTINUE taking these medications    oxyCODONE-acetaminophen 5-325 MG per tablet  Commonly known as:  PERCOCET     pregabalin 150 MG capsule  Commonly known as:  LYRICA  Take 1 capsule by mouth 2 times daily           Where to Get Your Medications      These medications were sent to [address], [place] [address]    Phone:  [phone]   · aspirin 81 MG EC tablet  · lisinopril 2.5 MG tablet  · rosuvastatin 40 MG tablet  · ticagrelor 90 MG Tabs tablet         Time Spent on discharge is  35 mins in patient examination, evaluation, counseling as well as medication reconciliation, prescriptions for required medications, discharge plan and follow up. Electronically signed by   Analy Humphreys MD  12/30/2017  1:26 PM      Thank you Dr. Bisi Scherer MD for the opportunity to be involved in this patient's care.

## 2018-01-09 PROBLEM — E66.09 CLASS 1 OBESITY DUE TO EXCESS CALORIES WITH SERIOUS COMORBIDITY AND BODY MASS INDEX (BMI) OF 31.0 TO 31.9 IN ADULT: Status: ACTIVE | Noted: 2018-01-09

## 2018-01-09 PROBLEM — E66.811 CLASS 1 OBESITY DUE TO EXCESS CALORIES WITH SERIOUS COMORBIDITY AND BODY MASS INDEX (BMI) OF 31.0 TO 31.9 IN ADULT: Status: ACTIVE | Noted: 2018-01-09

## 2018-01-09 PROBLEM — T44.7X5A ADVERSE REACTION TO BETA-BLOCKER: Status: ACTIVE | Noted: 2018-01-09

## 2018-01-09 PROBLEM — Z95.5 STATUS POST INSERTION OF DRUG-ELUTING STENT INTO LEFT ANTERIOR DESCENDING (LAD) ARTERY: Status: ACTIVE | Noted: 2018-01-09

## 2018-04-11 PROBLEM — R77.8 ELEVATED TROPONIN: Status: RESOLVED | Noted: 2017-12-28 | Resolved: 2018-04-11

## 2018-04-11 PROBLEM — R79.89 ELEVATED TROPONIN: Status: RESOLVED | Noted: 2017-12-28 | Resolved: 2018-04-11

## 2019-08-27 PROBLEM — Z28.21 REFUSED INFLUENZA VACCINE: Status: ACTIVE | Noted: 2019-08-27

## 2019-08-27 PROBLEM — Z28.21 REFUSED PNEUMOCOCCAL VACCINATION: Status: ACTIVE | Noted: 2019-08-27

## 2021-09-30 ENCOUNTER — OFFICE VISIT (OUTPATIENT)
Dept: PODIATRY | Age: 71
End: 2021-09-30
Payer: COMMERCIAL

## 2021-09-30 VITALS — WEIGHT: 203 LBS | BODY MASS INDEX: 29.06 KG/M2 | HEIGHT: 70 IN

## 2021-09-30 DIAGNOSIS — L08.9 INFECTION OF TOE: ICD-10-CM

## 2021-09-30 DIAGNOSIS — B35.1 ONYCHOMYCOSIS: Primary | ICD-10-CM

## 2021-09-30 DIAGNOSIS — M79.675 PAIN OF GREAT TOE, LEFT: ICD-10-CM

## 2021-09-30 PROCEDURE — 4040F PNEUMOC VAC/ADMIN/RCVD: CPT | Performed by: PODIATRIST

## 2021-09-30 PROCEDURE — 1123F ACP DISCUSS/DSCN MKR DOCD: CPT | Performed by: PODIATRIST

## 2021-09-30 PROCEDURE — G8427 DOCREV CUR MEDS BY ELIG CLIN: HCPCS | Performed by: PODIATRIST

## 2021-09-30 PROCEDURE — 1036F TOBACCO NON-USER: CPT | Performed by: PODIATRIST

## 2021-09-30 PROCEDURE — 99203 OFFICE O/P NEW LOW 30 MIN: CPT | Performed by: PODIATRIST

## 2021-09-30 PROCEDURE — 11730 AVULSION NAIL PLATE SIMPLE 1: CPT | Performed by: PODIATRIST

## 2021-09-30 PROCEDURE — G8417 CALC BMI ABV UP PARAM F/U: HCPCS | Performed by: PODIATRIST

## 2021-09-30 PROCEDURE — 3017F COLORECTAL CA SCREEN DOC REV: CPT | Performed by: PODIATRIST

## 2021-09-30 RX ORDER — GABAPENTIN 600 MG/1
600 TABLET ORAL 3 TIMES DAILY
COMMUNITY
Start: 2021-07-29 | End: 2022-07-29

## 2021-10-01 ASSESSMENT — ENCOUNTER SYMPTOMS
COLOR CHANGE: 0
DIARRHEA: 0
NAUSEA: 0
VOMITING: 0
CONSTIPATION: 0

## 2021-10-01 NOTE — PROGRESS NOTES
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 Several months, no pain. No injury. Started with an infection, abscess proximal nail fold, nail getting thick, loose, possibly a new nail growing under. Currently denies F/C/N/V. Allergies   Allergen Reactions    Seasonal      Sneezing cough       Past Medical History:   Diagnosis Date    CAD (coronary artery disease)     Fall 08/2013    FRACTURE HUMERAL HEAD    Heart attack ([facility] [address]     History of shoulder surgery 08/2013    MANIPULATION UNDER ANESTHESIA    Hyperlipidemia, mixed 6/2/2016    ON RX    Kidney stone 04/2015    PASSED ON OWN    Neuropathy 2007    BILAT FEET    Shingles 02/2007       Prior to Admission medications    Medication Sig Start Date End Date Taking? Authorizing Provider   gabapentin (NEURONTIN) 600 MG tablet Take 600 mg by mouth 3 times daily.  7/29/21 7/29/22 Yes Historical Provider, MD   rosuvastatin (CRESTOR) 40 MG tablet TAKE 1 TABLET DAILY 2/24/21  Yes Brenna Magana MD   aspirin 81 MG EC tablet Take 1 tablet by mouth daily 12/31/17  Yes Lopez Romero MD       Past Surgical History:   Procedure Laterality Date    BACK SURGERY  03/1989    LAMINOTOMY L4-5    CORONARY ANGIOPLASTY WITH STENT PLACEMENT      FINGER TRIGGER RELEASE Right 12/07/2017    middle    HAND SURGERY Right 2006    RING TRIGGER FINGER    MD INCISE FINGER TENDON SHEATH Right 12/7/2017    RELEASE A-ONE PULLEY INDEX, MIDDLE FINGERS performed by Lizzy Yeung MD at [place] [address]       Family History   Problem Relation Age of Onset    Heart Disease Father     Heart Attack Father     Cancer Sister         BRAIN MENIGIOMA    Cancer Sister         [facility]       Social History Tobacco Use    Smoking status: Never Smoker    Smokeless tobacco: Never Used   Substance Use Topics    Alcohol use: Yes     Comment: BEER WINE OR MIXED DRINKS 6 BEERS A MONTH WINE OR MIXED DRIINKS  SELDOM       Review of Systems   Constitutional: Negative for chills, diaphoresis, fatigue, fever and unexpected weight change. Cardiovascular: Negative for leg swelling. Gastrointestinal: Negative for constipation, diarrhea, nausea and vomiting. Musculoskeletal: Positive for gait problem. Negative for arthralgias and joint swelling. Skin: Negative for color change, pallor, rash and wound. Neurological: Negative for weakness and numbness. Lower Extremity Physical Examination:     Vitals: There were no vitals filed for this visit. General: AAO x 3 in NAD. Vascular: DP and PT pulses palpable 2/4, Bilateral.  CFT <3 seconds, Bilateral.  Hair growth present to the level of the digits, Bilateral.  Edema absent, Bilateral.  Varicosities absent, Bilateral. Erythema absent, Bilateral    Neurological:  Sensation present to light touch to level of digits, Bilateral.    Musculoskeletal: Muscle strength 5/5, Bilateral.      Integument: Warm, dry, supple, Bilateral.  Open lesion absent, Bilateral. Left hallus nail extremely thick, discolored, loose, subungual debris present. Gait analysis:     Asessment: Patient is a 72 y.o. male with:   1. Onychomycosis    2. Pain of great toe, left    3. Infection of toe          Plan: Patient examined and evaluated. Current condition and treatment options discussed in detail. Verbal and written instructions given to patient. Contact office with any questions/problems/concerns. The left hallux toe was anesthestized with 3cc's of 2% Lidocaine plain, The toe was prepped and draped in the usual sterile fashion. Bleeding was controlled with a toe tourniquet. The nail was removed completely to the level of the matrix.  The tourniquet was released and a sterile dressing was applied with antibiotic oinment/amerigel. The patient tolerated the procedure well without apparent complications. Oral and written instructions were dispensed. Orders Placed This Encounter   Procedures    SD REMOVAL OF NAIL PLATE     No orders of the defined types were placed in this encounter. RTC in as needed.     9/30/2021

## 2022-01-25 PROBLEM — M75.32 CALCIFIC TENDONITIS OF LEFT SHOULDER: Status: ACTIVE | Noted: 2021-10-12

## 2022-01-25 PROBLEM — R07.9 CHEST PAIN ON EXERTION: Status: RESOLVED | Noted: 2017-12-28 | Resolved: 2022-01-25

## 2022-01-25 PROBLEM — H66.90 OTITIS: Status: ACTIVE | Noted: 2021-05-13

## 2022-01-25 PROBLEM — E66.811 CLASS 1 OBESITY DUE TO EXCESS CALORIES WITH SERIOUS COMORBIDITY AND BODY MASS INDEX (BMI) OF 31.0 TO 31.9 IN ADULT: Status: RESOLVED | Noted: 2018-01-09 | Resolved: 2022-01-25

## 2022-01-25 PROBLEM — G62.9 POLYNEUROPATHY: Status: ACTIVE | Noted: 2021-05-13

## 2022-01-25 PROBLEM — N20.0 KIDNEY STONE: Status: ACTIVE | Noted: 2021-05-13

## 2022-01-25 PROBLEM — E66.09 CLASS 1 OBESITY DUE TO EXCESS CALORIES WITH SERIOUS COMORBIDITY AND BODY MASS INDEX (BMI) OF 31.0 TO 31.9 IN ADULT: Status: RESOLVED | Noted: 2018-01-09 | Resolved: 2022-01-25

## 2022-01-25 PROBLEM — M65.321 TRIGGER INDEX FINGER OF RIGHT HAND: Status: RESOLVED | Noted: 2017-12-11 | Resolved: 2022-01-25

## 2022-01-25 PROBLEM — M65.331 TRIGGER MIDDLE FINGER OF RIGHT HAND: Status: RESOLVED | Noted: 2017-12-11 | Resolved: 2022-01-25

## 2022-01-26 ENCOUNTER — TELEPHONE (OUTPATIENT)
Dept: UROLOGY | Age: 72
End: 2022-01-26

## 2022-02-22 ENCOUNTER — OFFICE VISIT (OUTPATIENT)
Dept: UROLOGY | Age: 72
End: 2022-02-22
Payer: COMMERCIAL

## 2022-02-22 VITALS
SYSTOLIC BLOOD PRESSURE: 126 MMHG | BODY MASS INDEX: 29.2 KG/M2 | HEART RATE: 71 BPM | WEIGHT: 204 LBS | TEMPERATURE: 97 F | HEIGHT: 70 IN | DIASTOLIC BLOOD PRESSURE: 78 MMHG

## 2022-02-22 DIAGNOSIS — Z12.5 PROSTATE CANCER SCREENING: Primary | ICD-10-CM

## 2022-02-22 DIAGNOSIS — N40.0 BPH WITHOUT OBSTRUCTION/LOWER URINARY TRACT SYMPTOMS: ICD-10-CM

## 2022-02-22 PROCEDURE — G8417 CALC BMI ABV UP PARAM F/U: HCPCS | Performed by: UROLOGY

## 2022-02-22 PROCEDURE — G8484 FLU IMMUNIZE NO ADMIN: HCPCS | Performed by: UROLOGY

## 2022-02-22 PROCEDURE — 1123F ACP DISCUSS/DSCN MKR DOCD: CPT | Performed by: UROLOGY

## 2022-02-22 PROCEDURE — G8427 DOCREV CUR MEDS BY ELIG CLIN: HCPCS | Performed by: UROLOGY

## 2022-02-22 PROCEDURE — 3017F COLORECTAL CA SCREEN DOC REV: CPT | Performed by: UROLOGY

## 2022-02-22 PROCEDURE — 1036F TOBACCO NON-USER: CPT | Performed by: UROLOGY

## 2022-02-22 PROCEDURE — 4040F PNEUMOC VAC/ADMIN/RCVD: CPT | Performed by: UROLOGY

## 2022-02-22 PROCEDURE — 99203 OFFICE O/P NEW LOW 30 MIN: CPT | Performed by: UROLOGY

## 2022-02-22 RX ORDER — TADALAFIL 5 MG/1
TABLET ORAL
COMMUNITY
Start: 2022-02-13

## 2022-02-22 ASSESSMENT — ENCOUNTER SYMPTOMS
RESPIRATORY NEGATIVE: 1
EYE PAIN: 0
EYES NEGATIVE: 1
DIARRHEA: 0
GASTROINTESTINAL NEGATIVE: 1
BACK PAIN: 0
VOMITING: 0
EYE REDNESS: 0
ABDOMINAL PAIN: 0
SHORTNESS OF BREATH: 0
NAUSEA: 0
CONSTIPATION: 0
COUGH: 0
WHEEZING: 0

## 2022-02-22 NOTE — LETTER
[address] [facility] [address]  Dept: [phone]  Dept Fax: [phone]        2/22/22    Patient: Spencer Silva Dr.  YOB: 1950    Dear Betty Pablo MD,    I had the pleasure of seeing one of your patients, Nacho Grajeda DR. today in the office today. Below are the relevant portions of my assessment and plan of care. IMPRESSION:  1. Prostate cancer screening    2. BPH without obstruction/lower urinary tract symptoms        PLAN:  He has a bit of an enlarged gland, but no voiding complaints. PSA and LUCIO are normal.   Can F/U as needed. Prescriptions Ordered:  No orders of the defined types were placed in this encounter. Orders Placed:  No orders of the defined types were placed in this encounter. Thank you for allowing me to participate in the care of this patient. I will keep you updated on this patient's follow up and I look forward to serving you and your patients again in the future.         Alondra Kuo MD

## 2022-02-22 NOTE — PROGRESS NOTES
Review of Systems   Constitutional: Negative. Negative for appetite change, chills and fatigue. Eyes: Negative. Negative for pain, redness and visual disturbance. Respiratory: Negative. Negative for cough, shortness of breath and wheezing. Cardiovascular: Negative. Negative for chest pain and leg swelling. Gastrointestinal: Negative. Negative for abdominal pain, constipation, diarrhea, nausea and vomiting. Genitourinary: Positive for urgency. Negative for difficulty urinating, dysuria, flank pain, frequency and hematuria. Musculoskeletal: Negative. Negative for back pain, joint swelling and myalgias. Skin: Negative. Negative for rash and wound. Neurological: Negative. Negative for dizziness, weakness and numbness. Hematological: Negative. Does not bruise/bleed easily.

## 2022-02-22 NOTE — PROGRESS NOTES
SCORE[de-identified] 1       Last BUN and creatinine:  Lab Results   Component Value Date    BUN 9 12/30/2017     Lab Results   Component Value Date    CREATININE 0.80 12/30/2017       Additional Lab/Culture results: none    Imaging Reviewed during this Office Visit: none  (results were independently reviewed by physician and radiology report verified)    PAST MEDICAL, FAMILY AND SOCIAL HISTORY:  Past Medical History:   Diagnosis Date    CAD (coronary artery disease)     Fall 08/2013    FRACTURE HUMERAL HEAD    Heart attack ([address]     History of shoulder surgery 08/2013    MANIPULATION UNDER ANESTHESIA    Hyperlipidemia, mixed 6/2/2016    ON RX    Kidney stone 04/2015    PASSED ON OWN    Neuropathy 2007    BILAT FEET    Shingles 02/2007     Past Surgical History:   Procedure Laterality Date    BACK SURGERY  03/1989    LAMINOTOMY L4-5    CORONARY ANGIOPLASTY WITH STENT PLACEMENT      FINGER TRIGGER RELEASE Right 12/07/2017    middle    HAND SURGERY Right 2006    RING TRIGGER FINGER    [place] INCISE FINGER TENDON SHEATH Right 12/07/2017    RELEASE A-ONE PULLEY INDEX, MIDDLE FINGERS performed by Ulla Peabody, MD at [facility] [facility] ARTHROSCOPY  11/16/2021    Arthroscopy Shoulder subacromial Decompression, Debridement Biceps Tendonitis, Excision fo Calcific Tendonitis (Left)- Wound class : Clean- Incision closure: deep and Superficial Layers.     TONSILLECTOMY  1954     Family History   Problem Relation Age of Onset    Heart Disease Father     Heart Attack Father     Cancer Sister         BRAIN MENIGIOMA    Cancer Sister         PANCREATIC,GLYOMA     Outpatient Medications Marked as Taking for the 2/22/22 encounter (Office Visit) with Noah Hernández MD   Medication Sig Dispense Refill    tadalafil (CIALIS) 5 MG tablet       Halobetasol Prop-Tazarotene (DUOBRII) 0.01-0.045 % LOTN Apply topically      co-enzyme Q-10 30 MG capsule Take 30 mg by mouth daily      vitamin D3 (CHOLECALCIFEROL) 125 MCG (5000 40 grams, smooth, no nodules. Assessment and Plan      1. Prostate cancer screening    2. BPH without obstruction/lower urinary tract symptoms           Plan:        He has a bit of an enlarged gland, but no voiding complaints. PSA and LUCIO are normal.   Can F/U as needed. Prescriptions Ordered:  No orders of the defined types were placed in this encounter. Orders Placed:  No orders of the defined types were placed in this encounter. Brady Nash MD    Agree with the ROS entered by the MA.

## 2022-06-07 ENCOUNTER — OFFICE VISIT (OUTPATIENT)
Dept: PODIATRY | Age: 72
End: 2022-06-07
Payer: COMMERCIAL

## 2022-06-07 VITALS — HEIGHT: 70 IN | WEIGHT: 204 LBS | BODY MASS INDEX: 29.2 KG/M2

## 2022-06-07 DIAGNOSIS — M79.672 PAIN OF LEFT FOOT: ICD-10-CM

## 2022-06-07 DIAGNOSIS — M77.8 ENTHESOPATHY OF LEFT FOOT: ICD-10-CM

## 2022-06-07 DIAGNOSIS — M72.2 PLANTAR FASCIITIS, LEFT: Primary | ICD-10-CM

## 2022-06-07 PROCEDURE — 1123F ACP DISCUSS/DSCN MKR DOCD: CPT | Performed by: PODIATRIST

## 2022-06-07 PROCEDURE — 99213 OFFICE O/P EST LOW 20 MIN: CPT | Performed by: PODIATRIST

## 2022-06-07 PROCEDURE — 20550 NJX 1 TENDON SHEATH/LIGAMENT: CPT | Performed by: PODIATRIST

## 2022-06-07 PROCEDURE — 1036F TOBACCO NON-USER: CPT | Performed by: PODIATRIST

## 2022-06-07 PROCEDURE — G8427 DOCREV CUR MEDS BY ELIG CLIN: HCPCS | Performed by: PODIATRIST

## 2022-06-07 PROCEDURE — 3017F COLORECTAL CA SCREEN DOC REV: CPT | Performed by: PODIATRIST

## 2022-06-07 PROCEDURE — G8417 CALC BMI ABV UP PARAM F/U: HCPCS | Performed by: PODIATRIST

## 2022-06-08 RX ORDER — TESTOSTERONE CYPIONATE 200 MG/ML
6 INJECTION INTRAMUSCULAR ONCE
Status: COMPLETED | OUTPATIENT
Start: 2022-06-08 | End: 2022-06-08

## 2022-06-08 RX ORDER — LIDOCAINE HYDROCHLORIDE 20 MG/ML
1 INJECTION, SOLUTION INFILTRATION; PERINEURAL ONCE
Status: COMPLETED | OUTPATIENT
Start: 2022-06-08 | End: 2022-06-08

## 2022-06-08 RX ADMIN — LIDOCAINE HYDROCHLORIDE 1 ML: 20 INJECTION, SOLUTION INFILTRATION; PERINEURAL at 12:59

## 2022-06-08 RX ADMIN — TESTOSTERONE CYPIONATE 6 MG: 200 INJECTION INTRAMUSCULAR at 12:59

## 2022-06-08 NOTE — PROGRESS NOTES
[facility]  Return Patient     Shiraz Inman Dr. 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 Symptoms began 1 month(s) ago and are unchanged . Patient relates pain is Present. Pain is rated 4 out of 10 and is described as waxing and waning. Treatments prior to today's visit include: cold laser, stretching, good shoes, walking boot. Currently denies F/C/N/V. Allergies   Allergen Reactions    Seasonal      Sneezing cough       Past Medical History:   Diagnosis Date    CAD (coronary artery disease)     Fall 08/2013    FRACTURE HUMERAL HEAD    Heart attack ([facility] [address]     History of shoulder surgery 08/2013    MANIPULATION UNDER ANESTHESIA    Hyperlipidemia, mixed 6/2/2016    ON RX    Kidney stone 04/2015    PASSED ON OWN    Neuropathy 2007    BILAT FEET    Shingles 02/2007       Prior to Admission medications    Medication Sig Start Date End Date Taking?  Authorizing Provider   tadalafil (CIALIS) 5 MG tablet  2/13/22  Yes Historical Provider, MD   Halobetasol Prop-Tazarotene (DUOBRII) 0.01-0.045 % LOTN Apply topically   Yes Historical Provider, MD   co-enzyme Q-10 30 MG capsule Take 30 mg by mouth daily   Yes Historical Provider, MD   vitamin D3 (CHOLECALCIFEROL) 125 MCG (5000 UT) TABS tablet Take 5,000 Units by mouth daily   Yes Historical Provider, MD   ascorbic acid (VITAMIN C) 1000 MG tablet Take 1,000 mg by mouth daily   Yes Historical Provider, MD   Omega-3 Fatty Acids (FISH OIL PO) Take 1 capsule by mouth daily   Yes Historical Provider, MD   BIOTIN PO Take by mouth   Yes Historical Provider, MD   B Complex Vitamins (B COMPLEX PO) Take 1 capsule by mouth daily   Yes Historical Provider, MD   glucosamine-chondroitin 500-400 MG tablet Take 1 tablet by mouth daily   Yes Historical Provider, MD   vitamin E 100 units capsule Take 100 Units by mouth daily   Yes Historical Provider, MD MAGNESIUM PO Take by mouth   Yes Historical Provider, MD   ORAL ELECTROLYTES PO Take by mouth   Yes Historical Provider, MD   rosuvastatin (CRESTOR) 40 MG tablet Take 1 tablet by mouth every evening 1/25/22  Yes Clarissa Henley MD   gabapentin (NEURONTIN) 600 MG tablet Take 600 mg by mouth 3 times daily. 7/29/21 7/29/22 Yes Historical Provider, MD   aspirin 81 MG EC tablet Take 1 tablet by mouth daily 12/31/17  Yes Shaun Levin MD       Review of Systems    Lower Extremity Physical Examination:     Vitals: There were no vitals filed for this visit. General: AAO x 3 in NAD. Integument:   Warm, dry, supple, Bilateral.  Open lesion absent, Bilateral.        Vascular: DP and PT pulses palpable 2/4, Bilateral.  CFT <3 seconds, Bilateral.  Hair growth present to the level of the digits, Bilateral.  Edema absent, Bilateral.  Varicosities absent, Bilateral. Erythema absent, Bilateral    Neurological:  Sensation present to light touch to level of digits, Bilateral.    Musculoskeletal: Muscle strength 5/5, Bilateral.  Pain present upon palpation of medial calcaneal tubercle, and plantar central heel, Left. normal medial longitudinal arch, Left. Ankle ROM normal,Left. 1      Asessment: Patient is a 80 y.o. male with:   1. Plantar fasciitis, left    2. Enthesopathy of left foot    3. Pain of left foot        Plan: Patient examined and evaluated. Current condition and treatment options discussed in detail. Advised pt to wear good shoes. Verbal and written instructions given to patient. Contact office with any questions/problems/concerns. Trigger point injection    Verbal consent obtained from patient for Left heel injection after all questions answered. Left heel palpated medially and most tender location adjacent to the medial calcaneal tubercle identified.   This area was prepped with an alcohol swab and 3 cc total of 1cc 1% lidocaine plain, 1 cc dexamethasone phosphate, and 1/2 cc kenolog was injected to the Left heel. A band-aid was applied, patient advised of possible local steroid reaction symptoms, and patient instructed to limit activities to this area for 24 hours. Medications ordered during this encounter were injected into the left. Orders Placed This Encounter   Procedures    ND INJECT TENDON SHEATH/LIGAMENT     Orders Placed This Encounter   Medications    lidocaine 2 % injection 1 mL    Dexamethasone Sodium Phosphate injection 6 mg    triamcinolone acetonide (KENALOG) injection 10 mg        RTC in as needed.     6/7/2022

## 2022-06-28 PROBLEM — J32.0 CHRONIC MAXILLARY SINUSITIS: Status: ACTIVE | Noted: 2022-06-28

## 2022-06-28 PROBLEM — J32.1 CHRONIC FRONTAL SINUSITIS: Status: ACTIVE | Noted: 2022-06-28

## 2022-06-28 PROBLEM — J30.1 ALLERGIC RHINITIS DUE TO POLLEN: Status: ACTIVE | Noted: 2022-06-28

## 2022-06-30 ENCOUNTER — OFFICE VISIT (OUTPATIENT)
Dept: PODIATRY | Age: 72
End: 2022-06-30
Payer: COMMERCIAL

## 2022-06-30 VITALS — WEIGHT: 200 LBS | HEIGHT: 70 IN | BODY MASS INDEX: 28.63 KG/M2

## 2022-06-30 DIAGNOSIS — M77.8 ENTHESOPATHY OF LEFT FOOT: ICD-10-CM

## 2022-06-30 DIAGNOSIS — M79.672 PAIN OF LEFT FOOT: ICD-10-CM

## 2022-06-30 DIAGNOSIS — M72.2 PLANTAR FASCIITIS, LEFT: Primary | ICD-10-CM

## 2022-06-30 PROCEDURE — 99213 OFFICE O/P EST LOW 20 MIN: CPT | Performed by: PODIATRIST

## 2022-06-30 PROCEDURE — G8417 CALC BMI ABV UP PARAM F/U: HCPCS | Performed by: PODIATRIST

## 2022-06-30 PROCEDURE — 1123F ACP DISCUSS/DSCN MKR DOCD: CPT | Performed by: PODIATRIST

## 2022-06-30 PROCEDURE — 3017F COLORECTAL CA SCREEN DOC REV: CPT | Performed by: PODIATRIST

## 2022-06-30 PROCEDURE — G8427 DOCREV CUR MEDS BY ELIG CLIN: HCPCS | Performed by: PODIATRIST

## 2022-06-30 PROCEDURE — 1036F TOBACCO NON-USER: CPT | Performed by: PODIATRIST

## 2022-06-30 NOTE — PROGRESS NOTES
[facility]  Return Patient     Bess Balderrama Dr. 80 y.o. male that presents for follow-up of   Chief Complaint   Patient presents with    Foot Pain     left heel pain, improving but sore, had some stabbing pain last week     Complaint of left heel pain. The injection helped. He has had some good days and bad days. Wearing Birkenstocks, helping with pain. Overall better. Symptoms began 1 month(s) ago and are unchanged . Patient relates pain is Present. Pain is rated 4 out of 10 and is described as waxing and waning. Treatments prior to today's visit include: cold laser, stretching, good shoes, walking boot. Currently denies F/C/N/V. Allergies   Allergen Reactions    Seasonal      Sneezing cough       Past Medical History:   Diagnosis Date    CAD (coronary artery disease)     Fall 08/2013    FRACTURE HUMERAL HEAD    [facility] attack ([facility] [address]     History of shoulder surgery 08/2013    MANIPULATION UNDER ANESTHESIA    Hyperlipidemia, mixed 6/2/2016    ON RX    Kidney stone 04/2015    PASSED ON OWN    Neuropathy 2007    BILAT FEET    Shingles 02/2007       Prior to Admission medications    Medication Sig Start Date End Date Taking?  Authorizing Provider   tadalafil (CIALIS) 5 MG tablet  2/13/22  Yes Historical Provider, MD   Halobetasol Prop-Tazarotene (DUOBRII) 0.01-0.045 % LOTN Apply topically   Yes Historical Provider, MD   co-enzyme Q-10 30 MG capsule Take 30 mg by mouth daily   Yes Historical Provider, MD   vitamin D3 (CHOLECALCIFEROL) 125 MCG (5000 UT) TABS tablet Take 5,000 Units by mouth daily   Yes Historical Provider, MD   ascorbic acid (VITAMIN C) 1000 MG tablet Take 1,000 mg by mouth daily   Yes Historical Provider, MD   Omega-3 Fatty Acids (FISH OIL PO) Take 1 capsule by mouth daily   Yes Historical Provider, MD   BIOTIN PO Take by mouth   Yes Historical Provider, MD   B Complex Vitamins (B COMPLEX PO) Take 1 capsule by mouth daily   Yes Historical Provider, MD glucosamine-chondroitin 500-400 MG tablet Take 1 tablet by mouth daily   Yes Historical Provider, MD   vitamin E 100 units capsule Take 100 Units by mouth daily   Yes Historical Provider, MD   MAGNESIUM PO Take by mouth   Yes Historical Provider, MD   ORAL ELECTROLYTES PO Take by mouth   Yes Historical Provider, MD   rosuvastatin (CRESTOR) 40 MG tablet Take 1 tablet by mouth every evening 1/25/22  Yes James Gonzales MD   gabapentin (NEURONTIN) 600 MG tablet Take 600 mg by mouth 3 times daily. 7/29/21 7/29/22 Yes Historical Provider, MD   aspirin 81 MG EC tablet Take 1 tablet by mouth daily 12/31/17  Yes Larry Grullon MD       Review of Systems    Lower Extremity Physical Examination:     Vitals: There were no vitals filed for this visit. General: AAO x 3 in NAD. Integument:   Warm, dry, supple, Bilateral.  Open lesion absent, Bilateral.        Vascular: DP and PT pulses palpable 2/4, Bilateral.  CFT <3 seconds, Bilateral.  Hair growth present to the level of the digits, Bilateral.  Edema absent, Bilateral.  Varicosities absent, Bilateral. Erythema absent, Bilateral    Neurological:  Sensation present to light touch to level of digits, Bilateral.    Musculoskeletal: Muscle strength 5/5, Bilateral.  No significant Pain present upon palpation of medial calcaneal tubercle, and plantar central heel, Left. normal medial longitudinal arch, Left. Ankle ROM normal,Left. 1      Asessment: Patient is a 80 y.o. male with:   1. Plantar fasciitis, left    2. Enthesopathy of left foot    3. Pain of left foot        Plan: Patient examined and evaluated. Current condition and treatment options discussed in detail. Advised pt to wear good shoes. Verbal and written instructions given to patient. Contact office with any questions/problems/concerns. Good shoes  HEP, laser    No orders of the defined types were placed in this encounter.     No orders of the defined types were placed in this

## 2023-08-17 ENCOUNTER — OFFICE VISIT (OUTPATIENT)
Dept: PODIATRY | Age: 73
End: 2023-08-17
Payer: COMMERCIAL

## 2023-08-17 DIAGNOSIS — M79.675 PAIN OF GREAT TOE, LEFT: ICD-10-CM

## 2023-08-17 DIAGNOSIS — B35.1 ONYCHOMYCOSIS: Primary | ICD-10-CM

## 2023-08-17 PROCEDURE — 99213 OFFICE O/P EST LOW 20 MIN: CPT | Performed by: PODIATRIST

## 2023-08-17 PROCEDURE — 1123F ACP DISCUSS/DSCN MKR DOCD: CPT | Performed by: PODIATRIST

## 2023-08-17 PROCEDURE — G8427 DOCREV CUR MEDS BY ELIG CLIN: HCPCS | Performed by: PODIATRIST

## 2023-08-17 PROCEDURE — 3017F COLORECTAL CA SCREEN DOC REV: CPT | Performed by: PODIATRIST

## 2023-08-17 PROCEDURE — 1036F TOBACCO NON-USER: CPT | Performed by: PODIATRIST

## 2023-08-17 PROCEDURE — G8421 BMI NOT CALCULATED: HCPCS | Performed by: PODIATRIST

## 2023-08-17 NOTE — PROGRESS NOTES
[facility]  Return Patient     Karla Moreno Dr. 71 y.o. male that presents for follow-up of   Chief Complaint   Patient presents with    Nail Problem     Thick left hallux toenail      The toenail was removed 10/2021. It has not grown much, getting a little thicker. Discolored, no pain, no redness. Wants to know options. Currently denies F/C/N/V. Allergies   Allergen Reactions    Seasonal      Sneezing cough       Past Medical History:   Diagnosis Date    CAD (coronary artery disease)     Fall 08/2013    FRACTURE HUMERAL HEAD    Heart attack ([address])     History of shoulder surgery 08/2013    MANIPULATION UNDER ANESTHESIA    Hyperlipidemia, mixed 6/2/2016    ON RX    Kidney stone 04/2015    PASSED ON OWN    Neuropathy 2007    BILAT FEET    Shingles 02/2007       Prior to Admission medications    Medication Sig Start Date End Date Taking?  Authorizing Provider   rosuvastatin (CRESTOR) 40 MG tablet TAKE 1 TABLET EVERY EVENING 1/20/23  Yes Carol Thomason MD   tadalafil (CIALIS) 5 MG tablet  2/13/22  Yes Historical Provider, MD   Halobetasol Prop-Tazarotene (DUOBRII) 0.01-0.045 % LOTN Apply topically   Yes Historical Provider, MD   co-enzyme Q-10 30 MG capsule Take 1 capsule by mouth daily   Yes Historical Provider, MD   vitamin D3 (CHOLECALCIFEROL) 125 MCG (5000 UT) TABS tablet Take 1 tablet by mouth daily   Yes Historical Provider, MD   ascorbic acid (VITAMIN C) 1000 MG tablet Take 1 tablet by mouth daily   Yes Historical Provider, MD   Omega-3 Fatty Acids (FISH OIL PO) Take 1 capsule by mouth daily   Yes Historical Provider, MD   BIOTIN PO Take by mouth   Yes Historical Provider, MD   B Complex Vitamins (B COMPLEX PO) Take 1 capsule by mouth daily   Yes Historical Provider, MD   glucosamine-chondroitin 500-400 MG tablet Take 1 tablet by mouth daily   Yes Historical Provider, MD   vitamin E 100 units capsule Take 1 capsule by mouth daily   Yes Historical Provider, MD   MAGNESIUM PO Take by mouth

## 2024-01-25 ENCOUNTER — OFFICE VISIT (OUTPATIENT)
Dept: PODIATRY | Age: 74
End: 2024-01-25

## 2024-01-25 VITALS — HEIGHT: 70 IN | BODY MASS INDEX: 26.48 KG/M2 | WEIGHT: 185 LBS

## 2024-01-25 DIAGNOSIS — B35.1 ONYCHOMYCOSIS: ICD-10-CM

## 2024-01-25 DIAGNOSIS — M79.675 PAIN OF GREAT TOE, LEFT: Primary | ICD-10-CM

## 2024-01-25 DIAGNOSIS — S90.212A CONTUSION OF LEFT GREAT TOE WITH DAMAGE TO NAIL, INITIAL ENCOUNTER: ICD-10-CM

## 2024-01-25 NOTE — PROGRESS NOTES
100 units capsule Take 1 capsule by mouth daily   Yes Arsalan Ren MD   MAGNESIUM PO Take by mouth   Yes Arsalan Ren MD   ORAL ELECTROLYTES PO Take by mouth   Yes Arsalan Ren MD   gabapentin (NEURONTIN) 600 MG tablet Take 1 tablet by mouth 3 times daily. 7/29/21 1/25/24 Yes Arsalan Ren MD   aspirin 81 MG EC tablet Take 1 tablet by mouth daily 12/31/17  Yes Wm Degroot MD       Review of Systems    Lower Extremity Physical Examination:     Vitals: There were no vitals filed for this visit.  General: AAO x 3 in NAD.     Integument:   Warm, dry, supple, Bilateral.  Open lesion absent, Bilateral.  Left hallus nail thick, loose, dried blood present, no erythema no pus.       Vascular: DP and PT pulses palpable 2/4, Bilateral.  CFT <3 seconds, Bilateral.  Hair growth present to the level of the digits, Bilateral.  Edema absent, Bilateral.      Neurological:  Sensation present to light touch to level of digits, Bilateral.    Musculoskeletal: Muscle strength 5/5, Bilateral.        Asessment: Patient is a 73 y.o. male with:   1. Pain of great toe, left    2. Contusion of left great toe with damage to nail, initial encounter    3. Onychomycosis        Plan: Pt was evaluated and examined. Patient was given personalized discharge instructions.  Pt will follow up in  as needed or sooner if any problems arise. Diagnosis was discussed with the pt and all of their questions were answered in detail. Proper foot hygiene and care was discussed with the pt.  Patient to check feet daily and contact the office with any questions/problems/concerns.  Other comorbidity noted and will be managed by PCP.        All labs were reviewed and all imagining including the above findings were reviewed PRIOR to and during the patients arrival and with the patient today.  Previous patient encounter was reviewed.  Encounters from the patients other medical providers were reviewed and noted.

## 2024-04-13 ENCOUNTER — HOSPITAL ENCOUNTER (OUTPATIENT)
Dept: GENERAL RADIOLOGY | Age: 74
End: 2024-04-13
Attending: FAMILY MEDICINE
Payer: MEDICARE

## 2024-04-13 ENCOUNTER — HOSPITAL ENCOUNTER (OUTPATIENT)
Age: 74
End: 2024-04-13
Payer: MEDICARE

## 2024-04-13 DIAGNOSIS — M79.675 PAIN OF TOE OF LEFT FOOT: ICD-10-CM

## 2024-04-13 PROCEDURE — 73660 X-RAY EXAM OF TOE(S): CPT

## 2024-07-03 ENCOUNTER — OFFICE VISIT (OUTPATIENT)
Dept: FAMILY MEDICINE CLINIC | Age: 74
End: 2024-07-03
Payer: COMMERCIAL

## 2024-07-03 VITALS
SYSTOLIC BLOOD PRESSURE: 113 MMHG | OXYGEN SATURATION: 97 % | HEART RATE: 65 BPM | TEMPERATURE: 98.4 F | DIASTOLIC BLOOD PRESSURE: 69 MMHG

## 2024-07-03 DIAGNOSIS — H04.302 DACRYOCYSTITIS OF LEFT LACRIMAL SAC: ICD-10-CM

## 2024-07-03 DIAGNOSIS — H10.9 BACTERIAL CONJUNCTIVITIS OF LEFT EYE: Primary | ICD-10-CM

## 2024-07-03 PROCEDURE — G8427 DOCREV CUR MEDS BY ELIG CLIN: HCPCS

## 2024-07-03 PROCEDURE — 1123F ACP DISCUSS/DSCN MKR DOCD: CPT

## 2024-07-03 PROCEDURE — 3017F COLORECTAL CA SCREEN DOC REV: CPT

## 2024-07-03 PROCEDURE — G8419 CALC BMI OUT NRM PARAM NOF/U: HCPCS

## 2024-07-03 PROCEDURE — 99213 OFFICE O/P EST LOW 20 MIN: CPT

## 2024-07-03 PROCEDURE — 1036F TOBACCO NON-USER: CPT

## 2024-07-03 RX ORDER — AMOXICILLIN AND CLAVULANATE POTASSIUM 875; 125 MG/1; MG/1
1 TABLET, FILM COATED ORAL 2 TIMES DAILY
Qty: 14 TABLET | Refills: 0 | Status: SHIPPED | OUTPATIENT
Start: 2024-07-03 | End: 2024-07-10

## 2024-07-03 RX ORDER — POLYMYXIN B SULFATE AND TRIMETHOPRIM 1; 10000 MG/ML; [USP'U]/ML
1 SOLUTION OPHTHALMIC EVERY 4 HOURS
Qty: 1 EACH | Refills: 0 | Status: SHIPPED | OUTPATIENT
Start: 2024-07-03 | End: 2024-07-10

## 2024-07-03 RX ORDER — CIPROFLOXACIN HYDROCHLORIDE 3.5 MG/ML
2 SOLUTION/ DROPS TOPICAL 4 TIMES DAILY
Qty: 3 ML | Refills: 0 | Status: CANCELLED | OUTPATIENT
Start: 2024-07-03 | End: 2024-07-10

## 2024-07-03 RX ORDER — TRAMADOL HYDROCHLORIDE 50 MG/1
50 TABLET ORAL EVERY 6 HOURS PRN
COMMUNITY
Start: 2024-06-21

## 2024-07-03 RX ORDER — KETOROLAC TROMETHAMINE 10 MG/1
TABLET, FILM COATED ORAL
COMMUNITY
Start: 2024-06-21

## 2024-07-03 ASSESSMENT — ENCOUNTER SYMPTOMS
EYE REDNESS: 1
EYE ITCHING: 0
RHINORRHEA: 0
COUGH: 0
SORE THROAT: 0
PHOTOPHOBIA: 0
EYE PAIN: 0
EYE DISCHARGE: 1
DOUBLE VISION: 0

## 2024-07-03 NOTE — PROGRESS NOTES
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  Christ Hernandez Dr. is c/o of Eye Drainage (Onset today, left )      HPI:     Patient presents to the Walk In Clinic for evaluation of left conjunctivitis, onset today.       Conjunctivitis   The current episode started today. The onset was sudden. The problem occurs continuously. The problem has been gradually worsening. The problem is moderate. Nothing relieves the symptoms. Nothing aggravates the symptoms. Associated symptoms include decreased vision, eye discharge and eye redness. Pertinent negatives include no fever, no double vision, no eye itching, no photophobia, no congestion, no ear discharge, no ear pain, no headaches, no rhinorrhea, no sore throat, no cough, no URI, no rash and no eye pain. The left eye is affected. The eyelid exhibits redness and swelling. He has been Behaving normally. He has been Eating and drinking normally.       Past Medical History:   Diagnosis Date    CAD (coronary artery disease)     Fall 08/2013    FRACTURE HUMERAL HEAD    Heart attack (HCC)     History of shoulder surgery 08/2013    MANIPULATION UNDER ANESTHESIA    Hyperlipidemia, mixed 6/2/2016    ON RX    Kidney stone 04/2015    PASSED ON OWN    Neuropathy 2007    BILAT FEET    Shingles 02/2007        Current Outpatient Medications   Medication Sig Dispense Refill    traMADol (ULTRAM) 50 MG tablet Take 1 tablet by mouth every 6 hours as needed for Pain. FOR UP TO 7 DAYS      ketorolac (TORADOL) 10 MG tablet TAKE 1 TABLET BY MOUTH EVERY 6 HOURS WHILE AWAKE FOR 5 DAYS      trimethoprim-polymyxin b (POLYTRIM) 87333-2.1 UNIT/ML-% ophthalmic solution Place 1 drop into both eyes every 4 hours for 7 days 1 each 0

## 2024-07-18 ENCOUNTER — OFFICE VISIT (OUTPATIENT)
Dept: FAMILY MEDICINE CLINIC | Age: 74
End: 2024-07-18
Payer: COMMERCIAL

## 2024-07-18 VITALS
SYSTOLIC BLOOD PRESSURE: 108 MMHG | OXYGEN SATURATION: 98 % | DIASTOLIC BLOOD PRESSURE: 69 MMHG | TEMPERATURE: 97.2 F | HEART RATE: 64 BPM

## 2024-07-18 DIAGNOSIS — H10.13 ALLERGIC CONJUNCTIVITIS OF BOTH EYES: Primary | ICD-10-CM

## 2024-07-18 PROCEDURE — 99213 OFFICE O/P EST LOW 20 MIN: CPT

## 2024-07-18 PROCEDURE — 1123F ACP DISCUSS/DSCN MKR DOCD: CPT

## 2024-07-18 PROCEDURE — 1036F TOBACCO NON-USER: CPT

## 2024-07-18 PROCEDURE — G8419 CALC BMI OUT NRM PARAM NOF/U: HCPCS

## 2024-07-18 PROCEDURE — G8427 DOCREV CUR MEDS BY ELIG CLIN: HCPCS

## 2024-07-18 PROCEDURE — 3017F COLORECTAL CA SCREEN DOC REV: CPT

## 2024-07-18 RX ORDER — AZELASTINE HYDROCHLORIDE 0.5 MG/ML
1 SOLUTION/ DROPS OPHTHALMIC 2 TIMES DAILY
Qty: 6 ML | Refills: 0 | Status: SHIPPED | OUTPATIENT
Start: 2024-07-18 | End: 2024-08-17

## 2024-07-18 ASSESSMENT — ENCOUNTER SYMPTOMS
COUGH: 0
DOUBLE VISION: 0
COLOR CHANGE: 0
RHINORRHEA: 1
EYE REDNESS: 1
STRIDOR: 0
EYE ITCHING: 1
EYE DISCHARGE: 1
WHEEZING: 0
SORE THROAT: 0
PHOTOPHOBIA: 0
VOMITING: 0
EYE PAIN: 0
ORTHOPNEA: 0
NAUSEA: 0

## 2024-07-18 NOTE — PROGRESS NOTES
into both eyes 2 times daily 6 mL 0    tadalafil (CIALIS) 5 MG tablet Take 1 tablet by mouth daily 90 tablet 3    traMADol (ULTRAM) 50 MG tablet Take 1 tablet by mouth every 6 hours as needed for Pain. FOR UP TO 7 DAYS      ketorolac (TORADOL) 10 MG tablet TAKE 1 TABLET BY MOUTH EVERY 6 HOURS WHILE AWAKE FOR 5 DAYS      rosuvastatin (CRESTOR) 40 MG tablet TAKE 1 TABLET EVERY EVENING 90 tablet 3    Tapinarof (VTAMA) 1 % CREA Apply daily 1 g 0    co-enzyme Q-10 30 MG capsule Take 1 capsule by mouth daily      vitamin D3 (CHOLECALCIFEROL) 125 MCG (5000 UT) TABS tablet Take 1 tablet by mouth daily      ascorbic acid (VITAMIN C) 1000 MG tablet Take 1 tablet by mouth daily      Omega-3 Fatty Acids (FISH OIL PO) Take 1 capsule by mouth daily      BIOTIN PO Take by mouth      B Complex Vitamins (B COMPLEX PO) Take 1 capsule by mouth daily      glucosamine-chondroitin 500-400 MG tablet Take 1 tablet by mouth daily      vitamin E 100 units capsule Take 1 capsule by mouth daily      MAGNESIUM PO Take by mouth      aspirin 81 MG EC tablet Take 1 tablet by mouth daily 30 tablet 3    gabapentin (NEURONTIN) 600 MG tablet Take 1 tablet by mouth 3 times daily.       No current facility-administered medications for this visit.     Allergies   Allergen Reactions    Seasonal      Sneezing cough       Subjective:      Review of Systems   Constitutional:  Negative for activity change, appetite change, chills, diaphoresis, fatigue and fever.   HENT:  Positive for congestion, postnasal drip and rhinorrhea. Negative for ear discharge, ear pain, hearing loss, mouth sores and sore throat.    Eyes:  Positive for discharge, redness and itching. Negative for double vision, photophobia and pain.   Respiratory:  Negative for cough, wheezing and stridor.    Cardiovascular:  Negative for orthopnea.   Gastrointestinal:  Negative for nausea and vomiting.   Skin:  Negative for color change, rash and wound.   Allergic/Immunologic: Positive for

## 2024-10-16 ENCOUNTER — HOSPITAL ENCOUNTER (OUTPATIENT)
Age: 74
Setting detail: OBSERVATION
Discharge: HOME | End: 2024-10-17
Attending: EMERGENCY MEDICINE | Admitting: EMERGENCY MEDICINE
Payer: MEDICARE

## 2024-10-16 ENCOUNTER — APPOINTMENT (OUTPATIENT)
Dept: GENERAL RADIOLOGY | Age: 74
End: 2024-10-16
Payer: MEDICARE

## 2024-10-16 DIAGNOSIS — I20.9 ANGINA PECTORIS (HCC): ICD-10-CM

## 2024-10-16 DIAGNOSIS — R07.9 CHEST PAIN, UNSPECIFIED TYPE: Primary | ICD-10-CM

## 2024-10-16 LAB
ANION GAP SERPL CALCULATED.3IONS-SCNC: 11 MMOL/L (ref 9–16)
BASOPHILS # BLD: 0.06 K/UL (ref 0–0.2)
BASOPHILS NFR BLD: 1 % (ref 0–2)
BUN SERPL-MCNC: 20 MG/DL (ref 8–23)
CALCIUM SERPL-MCNC: 9.1 MG/DL (ref 8.6–10.4)
CHLORIDE SERPL-SCNC: 102 MMOL/L (ref 98–107)
CO2 SERPL-SCNC: 24 MMOL/L (ref 20–31)
CREAT SERPL-MCNC: 0.8 MG/DL (ref 0.7–1.2)
EOSINOPHIL # BLD: 0.16 K/UL (ref 0–0.44)
EOSINOPHILS RELATIVE PERCENT: 2 % (ref 1–4)
ERYTHROCYTE [DISTWIDTH] IN BLOOD BY AUTOMATED COUNT: 12.2 % (ref 11.8–14.4)
GFR, ESTIMATED: >90 ML/MIN/1.73M2
GLUCOSE SERPL-MCNC: 92 MG/DL (ref 74–99)
HCT VFR BLD AUTO: 49.3 % (ref 40.7–50.3)
HGB BLD-MCNC: 16.6 G/DL (ref 13–17)
IMM GRANULOCYTES # BLD AUTO: <0.03 K/UL (ref 0–0.3)
IMM GRANULOCYTES NFR BLD: 0 %
LYMPHOCYTES NFR BLD: 2.5 K/UL (ref 1.1–3.7)
LYMPHOCYTES RELATIVE PERCENT: 38 % (ref 24–43)
MCH RBC QN AUTO: 31.6 PG (ref 25.2–33.5)
MCHC RBC AUTO-ENTMCNC: 33.7 G/DL (ref 28.4–34.8)
MCV RBC AUTO: 93.7 FL (ref 82.6–102.9)
MONOCYTES NFR BLD: 0.63 K/UL (ref 0.1–1.2)
MONOCYTES NFR BLD: 10 % (ref 3–12)
NEUTROPHILS NFR BLD: 49 % (ref 36–65)
NEUTS SEG NFR BLD: 3.18 K/UL (ref 1.5–8.1)
NRBC BLD-RTO: 0 PER 100 WBC
PLATELET # BLD AUTO: 146 K/UL (ref 138–453)
PMV BLD AUTO: 10.4 FL (ref 8.1–13.5)
POTASSIUM SERPL-SCNC: 4.1 MMOL/L (ref 3.7–5.3)
RBC # BLD AUTO: 5.26 M/UL (ref 4.21–5.77)
SODIUM SERPL-SCNC: 137 MMOL/L (ref 136–145)
TROPONIN I SERPL HS-MCNC: 11 NG/L (ref 0–22)
TROPONIN I SERPL HS-MCNC: 11 NG/L (ref 0–22)
WBC OTHER # BLD: 6.6 K/UL (ref 3.5–11.3)

## 2024-10-16 PROCEDURE — 93005 ELECTROCARDIOGRAM TRACING: CPT | Performed by: EMERGENCY MEDICINE

## 2024-10-16 PROCEDURE — 85025 COMPLETE CBC W/AUTO DIFF WBC: CPT

## 2024-10-16 PROCEDURE — 71045 X-RAY EXAM CHEST 1 VIEW: CPT

## 2024-10-16 PROCEDURE — 84484 ASSAY OF TROPONIN QUANT: CPT

## 2024-10-16 PROCEDURE — 99285 EMERGENCY DEPT VISIT HI MDM: CPT

## 2024-10-16 PROCEDURE — 6370000000 HC RX 637 (ALT 250 FOR IP): Performed by: EMERGENCY MEDICINE

## 2024-10-16 PROCEDURE — 80048 BASIC METABOLIC PNL TOTAL CA: CPT

## 2024-10-16 PROCEDURE — G0378 HOSPITAL OBSERVATION PER HR: HCPCS

## 2024-10-16 RX ORDER — ONDANSETRON 4 MG/1
4 TABLET, ORALLY DISINTEGRATING ORAL EVERY 8 HOURS PRN
Status: DISCONTINUED | OUTPATIENT
Start: 2024-10-16 | End: 2024-10-17 | Stop reason: HOSPADM

## 2024-10-16 RX ORDER — ROSUVASTATIN CALCIUM 20 MG/1
40 TABLET, COATED ORAL NIGHTLY
Status: DISCONTINUED | OUTPATIENT
Start: 2024-10-16 | End: 2024-10-17 | Stop reason: HOSPADM

## 2024-10-16 RX ORDER — ONDANSETRON 2 MG/ML
4 INJECTION INTRAMUSCULAR; INTRAVENOUS EVERY 6 HOURS PRN
Status: DISCONTINUED | OUTPATIENT
Start: 2024-10-16 | End: 2024-10-17 | Stop reason: HOSPADM

## 2024-10-16 RX ORDER — ACETAMINOPHEN 650 MG/1
650 SUPPOSITORY RECTAL EVERY 6 HOURS PRN
Status: DISCONTINUED | OUTPATIENT
Start: 2024-10-16 | End: 2024-10-17 | Stop reason: HOSPADM

## 2024-10-16 RX ORDER — ENOXAPARIN SODIUM 100 MG/ML
40 INJECTION SUBCUTANEOUS DAILY
Status: DISCONTINUED | OUTPATIENT
Start: 2024-10-17 | End: 2024-10-17 | Stop reason: HOSPADM

## 2024-10-16 RX ORDER — SODIUM CHLORIDE 0.9 % (FLUSH) 0.9 %
5-40 SYRINGE (ML) INJECTION EVERY 12 HOURS SCHEDULED
Status: DISCONTINUED | OUTPATIENT
Start: 2024-10-16 | End: 2024-10-17 | Stop reason: HOSPADM

## 2024-10-16 RX ORDER — POTASSIUM CHLORIDE 1500 MG/1
40 TABLET, EXTENDED RELEASE ORAL PRN
Status: DISCONTINUED | OUTPATIENT
Start: 2024-10-16 | End: 2024-10-17 | Stop reason: HOSPADM

## 2024-10-16 RX ORDER — ASPIRIN 81 MG/1
81 TABLET, CHEWABLE ORAL DAILY
Status: DISCONTINUED | OUTPATIENT
Start: 2024-10-16 | End: 2024-10-17 | Stop reason: HOSPADM

## 2024-10-16 RX ORDER — ACETAMINOPHEN 325 MG/1
650 TABLET ORAL EVERY 6 HOURS PRN
Status: DISCONTINUED | OUTPATIENT
Start: 2024-10-16 | End: 2024-10-17 | Stop reason: HOSPADM

## 2024-10-16 RX ORDER — POTASSIUM CHLORIDE 7.45 MG/ML
10 INJECTION INTRAVENOUS PRN
Status: DISCONTINUED | OUTPATIENT
Start: 2024-10-16 | End: 2024-10-17 | Stop reason: HOSPADM

## 2024-10-16 RX ORDER — SODIUM CHLORIDE 9 MG/ML
INJECTION, SOLUTION INTRAVENOUS PRN
Status: DISCONTINUED | OUTPATIENT
Start: 2024-10-16 | End: 2024-10-17 | Stop reason: HOSPADM

## 2024-10-16 RX ORDER — MAGNESIUM SULFATE IN WATER 40 MG/ML
2000 INJECTION, SOLUTION INTRAVENOUS PRN
Status: DISCONTINUED | OUTPATIENT
Start: 2024-10-16 | End: 2024-10-17 | Stop reason: HOSPADM

## 2024-10-16 RX ORDER — GABAPENTIN 600 MG/1
600 TABLET ORAL 3 TIMES DAILY
Status: DISCONTINUED | OUTPATIENT
Start: 2024-10-16 | End: 2024-10-17 | Stop reason: HOSPADM

## 2024-10-16 RX ORDER — ASPIRIN 81 MG/1
81 TABLET, CHEWABLE ORAL DAILY
Status: DISCONTINUED | OUTPATIENT
Start: 2024-10-17 | End: 2024-10-16

## 2024-10-16 RX ORDER — POLYETHYLENE GLYCOL 3350 17 G/17G
17 POWDER, FOR SOLUTION ORAL DAILY PRN
Status: DISCONTINUED | OUTPATIENT
Start: 2024-10-16 | End: 2024-10-17 | Stop reason: HOSPADM

## 2024-10-16 RX ORDER — SODIUM CHLORIDE 0.9 % (FLUSH) 0.9 %
5-40 SYRINGE (ML) INJECTION PRN
Status: DISCONTINUED | OUTPATIENT
Start: 2024-10-16 | End: 2024-10-17 | Stop reason: HOSPADM

## 2024-10-16 RX ADMIN — GABAPENTIN 600 MG: 600 TABLET ORAL at 23:51

## 2024-10-16 RX ADMIN — ASPIRIN 81 MG 81 MG: 81 TABLET ORAL at 23:51

## 2024-10-16 ASSESSMENT — PAIN DESCRIPTION - PAIN TYPE: TYPE: ACUTE PAIN

## 2024-10-16 ASSESSMENT — PAIN SCALES - GENERAL: PAINLEVEL_OUTOF10: 3

## 2024-10-16 ASSESSMENT — PAIN DESCRIPTION - ORIENTATION: ORIENTATION: LEFT;UPPER

## 2024-10-16 ASSESSMENT — PAIN DESCRIPTION - DESCRIPTORS: DESCRIPTORS: TIGHTNESS

## 2024-10-16 ASSESSMENT — PAIN DESCRIPTION - LOCATION: LOCATION: CHEST

## 2024-10-17 ENCOUNTER — APPOINTMENT (OUTPATIENT)
Dept: NUCLEAR MEDICINE | Age: 74
End: 2024-10-17
Payer: MEDICARE

## 2024-10-17 ENCOUNTER — HOSPITAL ENCOUNTER (OUTPATIENT)
Age: 74
Setting detail: OBSERVATION
Discharge: HOME | End: 2024-10-19
Payer: MEDICARE

## 2024-10-17 VITALS
OXYGEN SATURATION: 98 % | RESPIRATION RATE: 18 BRPM | WEIGHT: 188 LBS | SYSTOLIC BLOOD PRESSURE: 109 MMHG | DIASTOLIC BLOOD PRESSURE: 68 MMHG | TEMPERATURE: 97.7 F | HEIGHT: 70 IN | BODY MASS INDEX: 26.92 KG/M2 | HEART RATE: 61 BPM

## 2024-10-17 PROBLEM — I25.10 CORONARY ARTERY DISEASE: Status: ACTIVE | Noted: 2024-10-17

## 2024-10-17 LAB
ECHO BSA: 2.05 M2
EKG ATRIAL RATE: 51 BPM
EKG P AXIS: 67 DEGREES
EKG P-R INTERVAL: 180 MS
EKG Q-T INTERVAL: 524 MS
EKG QRS DURATION: 130 MS
EKG QTC CALCULATION (BAZETT): 482 MS
EKG R AXIS: -12 DEGREES
EKG T AXIS: 56 DEGREES
EKG VENTRICULAR RATE: 51 BPM
NUC STRESS EJECTION FRACTION: 75 %
NUC STRESS LV EDV: 56 ML (ref 67–155)
STRESS BASELINE DIAS BP: 58 MMHG
STRESS BASELINE HR: 50 BPM
STRESS BASELINE SYS BP: 103 MMHG
STRESS ESTIMATED WORKLOAD: 1 METS
STRESS PEAK DIAS BP: 65 MMHG
STRESS PEAK SYS BP: 112 MMHG
STRESS PERCENT HR ACHIEVED: 48 %
STRESS POST PEAK HR: 71 BPM
STRESS RATE PRESSURE PRODUCT: 7952 BPM*MMHG
STRESS TARGET HR: 148 BPM
TID: 1.04

## 2024-10-17 PROCEDURE — 93005 ELECTROCARDIOGRAM TRACING: CPT

## 2024-10-17 PROCEDURE — 93017 CV STRESS TEST TRACING ONLY: CPT

## 2024-10-17 PROCEDURE — 2580000003 HC RX 258: Performed by: EMERGENCY MEDICINE

## 2024-10-17 PROCEDURE — 6370000000 HC RX 637 (ALT 250 FOR IP): Performed by: EMERGENCY MEDICINE

## 2024-10-17 PROCEDURE — 2580000003 HC RX 258: Performed by: INTERNAL MEDICINE

## 2024-10-17 PROCEDURE — G0378 HOSPITAL OBSERVATION PER HR: HCPCS

## 2024-10-17 PROCEDURE — 99222 1ST HOSP IP/OBS MODERATE 55: CPT | Performed by: INTERNAL MEDICINE

## 2024-10-17 PROCEDURE — 93010 ELECTROCARDIOGRAM REPORT: CPT | Performed by: INTERNAL MEDICINE

## 2024-10-17 PROCEDURE — A9500 TC99M SESTAMIBI: HCPCS | Performed by: EMERGENCY MEDICINE

## 2024-10-17 PROCEDURE — 6360000002 HC RX W HCPCS: Performed by: INTERNAL MEDICINE

## 2024-10-17 PROCEDURE — 78452 HT MUSCLE IMAGE SPECT MULT: CPT

## 2024-10-17 PROCEDURE — 93018 CV STRESS TEST I&R ONLY: CPT | Performed by: INTERNAL MEDICINE

## 2024-10-17 PROCEDURE — 3430000000 HC RX DIAGNOSTIC RADIOPHARMACEUTICAL: Performed by: EMERGENCY MEDICINE

## 2024-10-17 PROCEDURE — 93016 CV STRESS TEST SUPVJ ONLY: CPT | Performed by: INTERNAL MEDICINE

## 2024-10-17 RX ORDER — ALBUTEROL SULFATE 90 UG/1
2 INHALANT RESPIRATORY (INHALATION) PRN
Status: DISCONTINUED | OUTPATIENT
Start: 2024-10-17 | End: 2024-10-17 | Stop reason: ALTCHOICE

## 2024-10-17 RX ORDER — TETRAKIS(2-METHOXYISOBUTYLISOCYANIDE)COPPER(I) TETRAFLUOROBORATE 1 MG/ML
47.5 INJECTION, POWDER, LYOPHILIZED, FOR SOLUTION INTRAVENOUS
Status: COMPLETED | OUTPATIENT
Start: 2024-10-17 | End: 2024-10-17

## 2024-10-17 RX ORDER — AMINOPHYLLINE 25 MG/ML
50 INJECTION, SOLUTION INTRAVENOUS PRN
Status: DISCONTINUED | OUTPATIENT
Start: 2024-10-17 | End: 2024-10-17 | Stop reason: ALTCHOICE

## 2024-10-17 RX ORDER — SODIUM CHLORIDE 0.9 % (FLUSH) 0.9 %
5-40 SYRINGE (ML) INJECTION PRN
Status: DISCONTINUED | OUTPATIENT
Start: 2024-10-17 | End: 2024-10-17 | Stop reason: ALTCHOICE

## 2024-10-17 RX ORDER — TETRAKIS(2-METHOXYISOBUTYLISOCYANIDE)COPPER(I) TETRAFLUOROBORATE 1 MG/ML
13.6 INJECTION, POWDER, LYOPHILIZED, FOR SOLUTION INTRAVENOUS
Status: COMPLETED | OUTPATIENT
Start: 2024-10-17 | End: 2024-10-17

## 2024-10-17 RX ORDER — SODIUM CHLORIDE 0.9 % (FLUSH) 0.9 %
5-40 SYRINGE (ML) INJECTION PRN
Status: ACTIVE | OUTPATIENT
Start: 2024-10-17 | End: 2024-10-17

## 2024-10-17 RX ORDER — SODIUM CHLORIDE 0.9 % (FLUSH) 0.9 %
10 SYRINGE (ML) INJECTION PRN
Status: DISCONTINUED | OUTPATIENT
Start: 2024-10-17 | End: 2024-10-17 | Stop reason: HOSPADM

## 2024-10-17 RX ORDER — NITROGLYCERIN 0.4 MG/1
0.4 TABLET SUBLINGUAL EVERY 5 MIN PRN
Status: ACTIVE | OUTPATIENT
Start: 2024-10-17 | End: 2024-10-17

## 2024-10-17 RX ORDER — NITROGLYCERIN 0.4 MG/1
0.4 TABLET SUBLINGUAL EVERY 5 MIN PRN
Status: DISCONTINUED | OUTPATIENT
Start: 2024-10-17 | End: 2024-10-17 | Stop reason: ALTCHOICE

## 2024-10-17 RX ORDER — SODIUM CHLORIDE 9 MG/ML
500 INJECTION, SOLUTION INTRAVENOUS CONTINUOUS PRN
Status: ACTIVE | OUTPATIENT
Start: 2024-10-17 | End: 2024-10-17

## 2024-10-17 RX ORDER — METOPROLOL TARTRATE 1 MG/ML
5 INJECTION, SOLUTION INTRAVENOUS EVERY 5 MIN PRN
Status: ACTIVE | OUTPATIENT
Start: 2024-10-17 | End: 2024-10-17

## 2024-10-17 RX ORDER — METOPROLOL TARTRATE 1 MG/ML
5 INJECTION, SOLUTION INTRAVENOUS EVERY 5 MIN PRN
Status: DISCONTINUED | OUTPATIENT
Start: 2024-10-17 | End: 2024-10-17 | Stop reason: ALTCHOICE

## 2024-10-17 RX ORDER — ATROPINE SULFATE 0.1 MG/ML
0.5 INJECTION INTRAVENOUS EVERY 5 MIN PRN
Status: ACTIVE | OUTPATIENT
Start: 2024-10-17 | End: 2024-10-17

## 2024-10-17 RX ORDER — SODIUM CHLORIDE 9 MG/ML
500 INJECTION, SOLUTION INTRAVENOUS CONTINUOUS PRN
Status: DISCONTINUED | OUTPATIENT
Start: 2024-10-17 | End: 2024-10-17 | Stop reason: ALTCHOICE

## 2024-10-17 RX ORDER — ATROPINE SULFATE 0.1 MG/ML
0.5 INJECTION INTRAVENOUS EVERY 5 MIN PRN
Status: DISCONTINUED | OUTPATIENT
Start: 2024-10-17 | End: 2024-10-17 | Stop reason: ALTCHOICE

## 2024-10-17 RX ORDER — REGADENOSON 0.08 MG/ML
0.4 INJECTION, SOLUTION INTRAVENOUS
Status: COMPLETED | OUTPATIENT
Start: 2024-10-17 | End: 2024-10-17

## 2024-10-17 RX ORDER — ALBUTEROL SULFATE 90 UG/1
2 INHALANT RESPIRATORY (INHALATION) PRN
Status: DISCONTINUED | OUTPATIENT
Start: 2024-10-17 | End: 2024-10-17 | Stop reason: HOSPADM

## 2024-10-17 RX ADMIN — SODIUM CHLORIDE, PRESERVATIVE FREE 10 ML: 5 INJECTION INTRAVENOUS at 10:50

## 2024-10-17 RX ADMIN — SODIUM CHLORIDE, PRESERVATIVE FREE 10 ML: 5 INJECTION INTRAVENOUS at 10:47

## 2024-10-17 RX ADMIN — SODIUM CHLORIDE, PRESERVATIVE FREE 10 ML: 5 INJECTION INTRAVENOUS at 12:20

## 2024-10-17 RX ADMIN — SODIUM CHLORIDE, PRESERVATIVE FREE 10 ML: 5 INJECTION INTRAVENOUS at 08:20

## 2024-10-17 RX ADMIN — SODIUM CHLORIDE, PRESERVATIVE FREE 10 ML: 5 INJECTION INTRAVENOUS at 10:52

## 2024-10-17 RX ADMIN — TETRAKIS(2-METHOXYISOBUTYLISOCYANIDE)COPPER(I) TETRAFLUOROBORATE 13.6 MILLICURIE: 1 INJECTION, POWDER, LYOPHILIZED, FOR SOLUTION INTRAVENOUS at 10:52

## 2024-10-17 RX ADMIN — REGADENOSON 0.4 MG: 0.08 INJECTION, SOLUTION INTRAVENOUS at 10:49

## 2024-10-17 RX ADMIN — GABAPENTIN 600 MG: 600 TABLET ORAL at 08:20

## 2024-10-17 RX ADMIN — TETRAKIS(2-METHOXYISOBUTYLISOCYANIDE)COPPER(I) TETRAFLUOROBORATE 47.5 MILLICURIE: 1 INJECTION, POWDER, LYOPHILIZED, FOR SOLUTION INTRAVENOUS at 12:20

## 2024-10-17 RX ADMIN — ROSUVASTATIN CALCIUM 40 MG: 20 TABLET, FILM COATED ORAL at 00:53

## 2024-10-17 ASSESSMENT — PAIN SCALES - GENERAL: PAINLEVEL_OUTOF10: 0

## 2024-10-17 NOTE — ED PROVIDER NOTES
[facility] OBSERVATION UNIT  Emergency Department  Emergency Medicine Resident Sign-out     Care of Christ Hernandez Dr. was assumed from Dr. Omer and is being seen for Chest Pain (X1 day. Hx of stents )  .  The patient's initial evaluation and plan have been discussed with the prior provider who initially evaluated the patient.     EMERGENCY DEPARTMENT COURSE / MEDICAL DECISION MAKING:       MEDICATIONS GIVEN:  Orders Placed This Encounter   Medications    sodium chloride flush 0.9 % injection 5-40 mL    sodium chloride flush 0.9 % injection 5-40 mL    0.9 % sodium chloride infusion    OR Linked Order Group     potassium chloride (KLOR-CON M) extended release tablet 40 mEq     potassium bicarb-citric acid (EFFER-K) effervescent tablet 40 mEq     potassium chloride 10 mEq/100 mL IVPB (Peripheral Line)    magnesium sulfate 2000 mg in 50 mL IVPB premix    enoxaparin (LOVENOX) injection 40 mg     Order Specific Question:   Indication of Use     Answer:   Prophylaxis-DVT/PE    OR Linked Order Group     ondansetron (ZOFRAN-ODT) disintegrating tablet 4 mg     ondansetron (ZOFRAN) injection 4 mg    polyethylene glycol (GLYCOLAX) packet 17 g    OR Linked Order Group     acetaminophen (TYLENOL) tablet 650 mg     acetaminophen (TYLENOL) suppository 650 mg    DISCONTD: aspirin chewable tablet 81 mg    gabapentin (NEURONTIN) tablet 600 mg    rosuvastatin (CRESTOR) tablet 40 mg    aspirin chewable tablet 81 mg       LABS / RADIOLOGY:     Labs Reviewed   CBC WITH AUTO DIFFERENTIAL   BASIC METABOLIC PANEL   TROPONIN   TROPONIN       XR CHEST PORTABLE    Result Date: 10/16/2024  EXAMINATION: ONE XRAY VIEW OF THE CHEST 10/16/2024 9:01 pm COMPARISON: None. HISTORY: ORDERING SYSTEM PROVIDED HISTORY: chest pain TECHNOLOGIST PROVIDED HISTORY: chest pain FINDINGS: Lungs: Clear. Pleura: No effusion or pneumothorax. Cardiomediastinal silhouette: Normal contours. Bones: No acute osseous findings. Soft tissues: Normal.     No acute  cardiopulmonary process.       RECENT VITALS:     Temp: 97.5 °F (36.4 °C),  Pulse: 52, Respirations: 18, BP: 124/69, SpO2: 96 %    This patient is a 73 y.o. Male with chest pain since this AM. Left sided pressure, non radiating. Heart score 4. Follows with Danilo.     ED Course as of 10/17/24 0101   Wed Oct 16, 2024   2154 WBC: 6.6 [SK]   2154 Hemoglobin Quant: 16.6 [SK]   2154 Platelet Count: 146 [SK]   2154 Troponin, High Sensitivity: 11 [SK]   2154 Sodium: 137 [SK]   2154 Potassium: 4.1 [SK]   2154 Creatinine: 0.8 [SK]   2235 Troponin, High Sensitivity: 11 [SK]   2324 Reevaluated patient he is continuing to have left-sided chest pain that he rates as a 2 out of 10 and feels like pressure.  Discussed with him plan for admission for observation for cardiology evaluation and he is agreeable [SK]      ED Course User Index  [SK] Jade Alvarez DO         OUTSTANDING TASKS / RECOMMENDATIONS:    Repeat trop  Dispo      FINAL IMPRESSION:     1. Chest pain, unspecified type        DISPOSITION:         DISPOSITION:  []  Discharge   []  Transfer -    [x]  Admission -     []  Against Medical Advice   []  Eloped   FOLLOW-UP: No follow-up provider specified.   DISCHARGE MEDICATIONS: Current Discharge Medication List              Jade Alvarez DO  Emergency Medicine Resident  [facility]

## 2024-10-17 NOTE — CONSULTS
those procedures where I was not present during the key portions. I have personally evaluated this patient and have completed at least one if not all key elements of the E/M (history, physical exam, and MDM). Additional findings are as noted.    CAD, pLAD stent. Residual disease of LAD/RCA on cath 2017. Recommend treadmill cardiolite stress test.    Gagan Henry MD MD

## 2024-10-17 NOTE — ED PROVIDER NOTES
BEER WINE OR MIXED DRINKS 6 BEERS A MONTH WINE OR MIXED DRIINKS  SELDOM    Drug use: No    Sexual activity: Not on file   Other Topics Concern    Not on file   Social History Narrative    Not on file     Social Determinants of Health     Financial Resource Strain: Low Risk  (2/15/2024)    Overall Financial Resource Strain (CARDIA)     Difficulty of Paying Living Expenses: Not hard at all   Food Insecurity: No Food Insecurity (2/15/2024)    Hunger Vital Sign     Worried About Running Out of Food in the Last Year: Never true     Ran Out of Food in the Last Year: Never true   Transportation Needs: Unknown (2/15/2024)    PRAPARE - Transportation     Lack of Transportation (Medical): Not on file     Lack of Transportation (Non-Medical): No   Physical Activity: Not on file   Stress: Not on file   Social Connections: Not on file   Intimate Partner Violence: Not on file   Housing Stability: Unknown (2/15/2024)    Housing Stability Vital Sign     Unable to Pay for Housing in the Last Year: Not on file     Number of Places Lived in the Last Year: Not on file     Unstable Housing in the Last Year: No       Family History   Problem Relation Age of Onset    Heart Disease Father     Heart Attack Father     Cancer Sister         BRAIN MENIGIOMA    Cancer Sister         PANCREATIC,GLYOMA       Allergies:  Seasonal    Home Medications:  Prior to Admission medications    Medication Sig Start Date End Date Taking? Authorizing Provider   rosuvastatin (CRESTOR) 40 MG tablet Take 1 tablet by mouth every evening 10/9/24   Tyron Carrasco DO   rosuvastatin (CRESTOR) 40 MG tablet Take 1 tablet by mouth every evening 10/9/24   Tyron Carrasco DO   tadalafil (CIALIS) 5 MG tablet Take 1 tablet by mouth daily as needed for Erectile Dysfunction 7/18/24   Tyron Carrasco DO   traMADol (ULTRAM) 50 MG tablet Take 1 tablet by mouth every 6 hours as needed for Pain. FOR UP TO 7 DAYS 6/21/24   Provider, MD Arsalan   ketorolac (TORADOL) 10 MG tablet TAKE 1  Resident    (Please note that portions of this note were completed with a voice recognition program.  Efforts were made to edit the dictations but occasionally words are mis-transcribed.)

## 2024-10-17 NOTE — PLAN OF CARE
Problem: Discharge Planning  Goal: Discharge to home or other facility with appropriate resources  10/17/2024 0923 by Cantu, Joseph, RN  Outcome: Progressing  10/17/2024 0511 by Carlita Desai RN  Outcome: Progressing  Flowsheets (Taken 10/17/2024 0119)  Discharge to home or other facility with appropriate resources:   Identify barriers to discharge with patient and caregiver   Arrange for needed discharge resources and transportation as appropriate   Identify discharge learning needs (meds, wound care, etc)     Problem: Pain  Goal: Verbalizes/displays adequate comfort level or baseline comfort level  10/17/2024 0923 by Cantu, Joseph, RN  Outcome: Progressing  10/17/2024 0511 by Carlita Desia RN  Outcome: Progressing     Problem: ABCDS Injury Assessment  Goal: Absence of physical injury  10/17/2024 0923 by Cantu, Joseph, RN  Outcome: Progressing  10/17/2024 0511 by Carlita Desai RN  Outcome: Progressing     Problem: Safety - Adult  Goal: Free from fall injury  10/17/2024 0923 by Cantu, Joseph, RN  Outcome: Progressing  10/17/2024 0511 by Carlita Desai RN  Outcome: Progressing

## 2024-10-17 NOTE — H&P
[facility]  CDU / OBSERVATION ENCOUNTER  Physician NOTE     Pt Name: Christ Hernandez Dr.  MRN: [id number]  Birthdate 1950  Date of evaluation: 10/17/24  Patient's PCP is :  Noelle Cooley MD    CHIEF COMPLAINT       Chief Complaint   Patient presents with    Chest Pain     X1 day. Hx of stents          HISTORY OF PRESENT ILLNESS    Christ Hernandez Dr. is a 73 y.o. male who presents with left sided chest pain. Prior history of NSTEMI s/p cardiac cath and stent in December 2017. Follows with Dr. Carrasco. Recent 1.5 hour flight this week; no history of clots.    Location/Symptom: left sided chest pain  Timing/Onset: started 10/16 AM at work  Provocation: none  Quality: \"hand is pressing on my chest\"; states it feels different from his NSTEMI chest pain  Radiation: none  Severity: 2/10  Timing/Duration: constant, has similar episodes that last <5 seconds  Modifying Factors: feels \"tighter\" with inspiration, worse when he presses on it    History was obtained in part through review of the ED chart. When possible, a direct discussion was had with ED nurses, residents, and attendings  REVIEW OF SYSTEMS       General ROS - No fevers, No malaise   Ophthalmic ROS - No discharge, No changes in vision  ENT ROS -  No sore throat, No rhinorrhea,   Respiratory ROS - no shortness of breath, no cough, no  wheezing  Cardiovascular ROS - chest pain; resolved, no dyspnea on exertion  Gastrointestinal ROS - No abdominal pain, no nausea or vomiting, no change in bowel habits, no black or bloody stools  Genito-Urinary ROS - No dysuria, trouble voiding, or hematuria  Musculoskeletal ROS - No myalgias, No arthalgias  Neurological ROS - No headache, no dizziness/lightheadedness, No focal weakness, no loss of sensation  Dermatological ROS - No lesions, No rash     (PQRS) Advance directives on face sheet per hospital policy. No change unless specifically mentioned in chart    PAST MEDICAL HISTORY    has a past medical  obtain cardiac workup, troponin EKG and chest x-ray and reevaluate     DIAGNOSTIC RESULTS     EKG: All EKG's are interpreted by the Observation Physician who either signs or Co-signs this chart in the absence of a cardiologist.    EKG Interpretation    Interpreted by observation physician    Rhythm: normal sinus   Rate: bradycardia  Axis: normal  Ectopy: none  Conduction: right bundle branch block  ST Segments: no acute change  T Waves: no acute change  Q Waves: none    Clinical Impression: no acute changes from previous         RADIOLOGY:   I directly visualized the following  images and reviewed the radiologist interpretations:    XR CHEST PORTABLE    Result Date: 10/16/2024  EXAMINATION: ONE XRAY VIEW OF THE CHEST 10/16/2024 9:01 pm COMPARISON: None. HISTORY: ORDERING SYSTEM PROVIDED HISTORY: chest pain TECHNOLOGIST PROVIDED HISTORY: chest pain FINDINGS: Lungs: Clear. Pleura: No effusion or pneumothorax. Cardiomediastinal silhouette: Normal contours. Bones: No acute osseous findings. Soft tissues: Normal.     No acute cardiopulmonary process.       LABS:  I have reviewed and interpreted all available lab results.  Labs Reviewed   CBC WITH AUTO DIFFERENTIAL   BASIC METABOLIC PANEL   TROPONIN   TROPONIN         CDU IMPRESSION / PLAN      Christ Hernandez Dr. is a 73 y.o. male who presents with left sided chest pain with history of NSTEMI and cardiac catherization and stent placement. He is asmitted for cardiac workup and evaluation. Chest pain has resolved and he is in no distress.     Cardiac consult    Testing ordered.  Patient pain-free at the time of evaluation.  EKG and enzymes unchanged.  Patient was anxious to leave.  Patient stated he had significant amount of work to be done at home.  Patient had no symptoms and had negative enzymes and EKG.    Patient was agreeable to get testing done.  Patient also requested that once testing was over that he be discharged if he is symptom-free.  I felt this was a

## 2024-10-17 NOTE — PLAN OF CARE
Problem: Discharge Planning  Goal: Discharge to home or other facility with appropriate resources  Outcome: Progressing  Flowsheets (Taken 10/17/2024 0119)  Discharge to home or other facility with appropriate resources:   Identify barriers to discharge with patient and caregiver   Arrange for needed discharge resources and transportation as appropriate   Identify discharge learning needs (meds, wound care, etc)     Problem: Pain  Goal: Verbalizes/displays adequate comfort level or baseline comfort level  Outcome: Progressing     Problem: ABCDS Injury Assessment  Goal: Absence of physical injury  Outcome: Progressing     Problem: Safety - Adult  Goal: Free from fall injury  Outcome: Progressing

## 2024-10-17 NOTE — ED NOTES
The following labs were labeled with appropriate pt sticker and tubed to lab:     [x] Blue     [x] Lavender   [] on ice  [x] Green/yellow  [x] Green/black [] on ice  [] Jean  [] on ice  [x] Yellow  [] Red  [] Pink  [] Type/ Screen  [] ABG  [] VBG    [] COVID-19 swab    [] Rapid  [] PCR  [] Flu swab  [] Peds Viral Panel     [] Urine Sample  [] Fecal Sample  [] Pelvic Cultures  [] Blood Cultures  [] X 2  [] STREP Cultures  [] Wound Cultures

## 2024-10-17 NOTE — DISCHARGE SUMMARY
CDU Discharge Summary        Patient:  Christ T Dr. David  YOB: 1950    MRN: [id number]   Acct: [id number]    Primary Care Physician: Noelle Cooley MD    Admit date:  10/16/2024  8:34 PM  Discharge date: No discharge date for patient encounter.    Discharge Diagnoses:     1.)  Patient had chest pain for day with acute onset. Enzymes and EKG were normal but has history of NSTEMI s/p cath and stent.  Treated with symptom management . Stress test was negative. Patient's symptoms are resolved with the plan to follow up outpatient    Follow-up:  Call today/tomorrow for a follow up appointment with Noelle Cooley MD , or return to the Emergency Room with worsening symptoms    Stressed to patient the importance of following up with primary care doctor for further workup/management of symptoms.  Pt verbalizes understanding and agrees with plan.    Discharge Medication Changes:       Medication List        ASK your doctor about these medications      ascorbic acid 1000 MG tablet  Commonly known as: VITAMIN C     aspirin 81 MG EC tablet  Take 1 tablet by mouth daily     B COMPLEX PO     BIOTIN PO     co-enzyme Q-10 30 MG capsule     FISH OIL PO     gabapentin 600 MG tablet  Commonly known as: NEURONTIN     glucosamine-chondroitin 500-400 MG tablet     ketorolac 10 MG tablet  Commonly known as: TORADOL     MAGNESIUM PO     * rosuvastatin 40 MG tablet  Commonly known as: CRESTOR  Take 1 tablet by mouth every evening     * rosuvastatin 40 MG tablet  Commonly known as: CRESTOR  Take 1 tablet by mouth every evening     tadalafil 5 MG tablet  Commonly known as: CIALIS  Take 1 tablet by mouth daily as needed for Erectile Dysfunction     traMADol 50 MG tablet  Commonly known as: ULTRAM     vitamin D3 125 MCG (5000 UT) Tabs tablet  Commonly known as: CHOLECALCIFEROL     vitamin E 100 units capsule     Vtama 1 % Crea  Generic drug: Tapinarof  Apply daily           * This list has 2 medication(s) that are

## 2024-10-17 NOTE — ED NOTES
ED to inpatient nurses report      Chief Complaint:  Chief Complaint   Patient presents with    Chest Pain     X1 day. Hx of stents      Present to ED from: Home    MOA:     LOC: alert and orientated to name, place, date  Mobility: Independent  Oxygen Baseline: Room air    Current needs required: Room air   Pending ED orders: STILL NEEDS CRESTOR IF HASN'T COME FROM PHARM BEFORE HE COMES TO OBS  Present condition: Stable    Why did the patient come to the ED? Patient to ED c/o left sided chest pain that started this morning while at work. Describes it like a \"hand is pressing on his chest\". States chest pain feels \"tighter\" with inspiration and that it has been persistent all day. Denies shortness of breath, n/v, diaphoresis. Denies recent illness or injury. States cardiac cath and stent placement in Dec 2017. Continued chest pain, agreeable for obs for cardiology tomorrow  What is the plan? Obs, cardiology consult  Any procedures or intervention occur? Labs-troponin stable at 11  Any safety concerns??none    Mental Status:       Psych Assessment:   Psychosocial  Psychosocial (WDL): Within Defined Limits  Vital signs   Vitals:    10/16/24 2210 10/16/24 2230 10/16/24 2235 10/16/24 2255   BP: 112/66 103/66     Pulse:   51 56   Resp:   13 19   Temp:       TempSrc:       SpO2:   98% 97%        Vitals:  Patient Vitals for the past 24 hrs:   BP Temp Temp src Pulse Resp SpO2   10/16/24 2255 -- -- -- 56 19 97 %   10/16/24 2235 -- -- -- 51 13 98 %   10/16/24 2230 103/66 -- -- -- -- --   10/16/24 2210 112/66 -- -- -- -- --   10/16/24 2154 -- -- -- 52 13 98 %   10/16/24 2100 110/66 -- -- 54 13 99 %   10/16/24 2044 -- 97.9 °F (36.6 °C) Oral -- -- --   10/16/24 2038 125/70 -- -- 54 12 100 %   10/16/24 2037 137/79 -- -- 56 15 99 %      Visit Vitals  /66   Pulse 56   Temp 97.9 °F (36.6 °C) (Oral)   Resp 19   SpO2 97%        LDAs:   Peripheral IV 10/16/24 Right Forearm (Active)   Site Assessment Clean, dry & intact 10/16/24

## 2024-10-17 NOTE — DISCHARGE INSTRUCTIONS
You are leaving before your testing is resulted.  You understand this and will watch for results.  We will watch for results and call you if there is any issue.  Follow-up with your primary care physician.  Return if you have any further chest pain or other discomfort or need reassessment.

## 2024-10-18 LAB
EKG ATRIAL RATE: 51 BPM
EKG P AXIS: 3 DEGREES
EKG P-R INTERVAL: 176 MS
EKG Q-T INTERVAL: 506 MS
EKG QRS DURATION: 122 MS
EKG QTC CALCULATION (BAZETT): 466 MS
EKG R AXIS: 3 DEGREES
EKG T AXIS: 25 DEGREES
EKG VENTRICULAR RATE: 51 BPM

## 2024-10-18 PROCEDURE — 93010 ELECTROCARDIOGRAM REPORT: CPT | Performed by: INTERNAL MEDICINE

## 2024-10-18 NOTE — DISCHARGE SUMMARY
CDU Discharge Summary        Patient:  Christ T Dr. David  YOB: 1950    MRN: [id number]   Acct: [id number]    Primary Care Physician: Noelle Cooley MD    Admit date:  10/16/2024  8:34 PM  Discharge date: 10/17/2024  5:06 PM     Discharge Diagnoses:     1.)  Acute chest pain secondary to unstable angina.  Improved with IV hydration, analgesics, rest and further cardiac evaluation    Follow-up:  Call today/tomorrow for a follow up appointment with Noelle Cooley MD , or return to the Emergency Room with worsening symptoms    Stressed to patient the importance of following up with primary care doctor for further workup/management of symptoms.  Pt verbalizes understanding and agrees with plan.    Discharge Medication Changes:       Medication List        CONTINUE taking these medications      ascorbic acid 1000 MG tablet  Commonly known as: VITAMIN C     aspirin 81 MG EC tablet  Take 1 tablet by mouth daily     B COMPLEX PO     BIOTIN PO     co-enzyme Q-10 30 MG capsule     FISH OIL PO     gabapentin 600 MG tablet  Commonly known as: NEURONTIN     glucosamine-chondroitin 500-400 MG tablet     ketorolac 10 MG tablet  Commonly known as: TORADOL     MAGNESIUM PO     * rosuvastatin 40 MG tablet  Commonly known as: CRESTOR  Take 1 tablet by mouth every evening     * rosuvastatin 40 MG tablet  Commonly known as: CRESTOR  Take 1 tablet by mouth every evening     tadalafil 5 MG tablet  Commonly known as: CIALIS  Take 1 tablet by mouth daily as needed for Erectile Dysfunction     traMADol 50 MG tablet  Commonly known as: ULTRAM     vitamin D3 125 MCG (5000 UT) Tabs tablet  Commonly known as: CHOLECALCIFEROL     vitamin E 100 units capsule     Vtama 1 % Crea  Generic drug: Tapinarof  Apply daily           * This list has 2 medication(s) that are the same as other medications prescribed for you. Read the directions carefully, and ask your doctor or other care provider to review them with you.

## (undated) DEVICE — STERILE POLYISOPRENE POWDER-FREE SURGICAL GLOVES: Brand: PROTEXIS

## (undated) DEVICE — Z DISCONTINUED BY MEDLINE USE 2711682 TRAY SKIN PREP DRY W/ PREM GLV

## (undated) DEVICE — BANDAGE GZ W2XL75IN ST RAYON POLY CNFRM STRTCH LTWT

## (undated) DEVICE — STERILE POLYISOPRENE POWDER-FREE SURGICAL GLOVES WITH EMOLLIENT COATING: Brand: PROTEXIS

## (undated) DEVICE — SVMMC HND

## (undated) DEVICE — SUTURE STL SZ 0 L18IN NONABSORBABLE UD TIE PRE-CUT MFIL DS26

## (undated) DEVICE — DRESSING PETRO W3XL8IN OIL EMUL N ADH GZ KNIT IMPREG CELOS

## (undated) DEVICE — Z CONVERTED USE 2162213 APPLICATOR PREP 4OZ CHG 4% BACTOSHIELD USE FOR HND WSH AND

## (undated) DEVICE — ZIMMER® STERILE DISPOSABLE TOURNIQUET CUFF WITH PROTECTIVE SLEEVE AND PLC, DUAL PORT, SINGLE BLADDER, 18 IN. (46 CM)

## (undated) DEVICE — GLOVE ORANGE PI 7 1/2   MSG9075